# Patient Record
Sex: FEMALE | Race: WHITE | NOT HISPANIC OR LATINO | Employment: OTHER | URBAN - NONMETROPOLITAN AREA
[De-identification: names, ages, dates, MRNs, and addresses within clinical notes are randomized per-mention and may not be internally consistent; named-entity substitution may affect disease eponyms.]

---

## 2018-07-06 ENCOUNTER — HISTORICAL (OUTPATIENT)
Dept: ADMINISTRATIVE | Facility: HOSPITAL | Age: 53
End: 2018-07-06

## 2019-07-16 ENCOUNTER — HISTORICAL (OUTPATIENT)
Dept: ADMINISTRATIVE | Facility: HOSPITAL | Age: 54
End: 2019-07-16

## 2019-09-16 ENCOUNTER — HISTORICAL (OUTPATIENT)
Dept: ADMINISTRATIVE | Facility: HOSPITAL | Age: 54
End: 2019-09-16

## 2020-10-20 ENCOUNTER — HISTORICAL (OUTPATIENT)
Dept: ADMINISTRATIVE | Facility: HOSPITAL | Age: 55
End: 2020-10-20

## 2021-04-07 ENCOUNTER — HISTORICAL (OUTPATIENT)
Dept: ADMINISTRATIVE | Facility: HOSPITAL | Age: 56
End: 2021-04-07

## 2021-04-07 LAB
ABS NEUT (OLG): 4.48 X10(3)/MCL (ref 2.1–9.2)
ALBUMIN SERPL-MCNC: 4.3 GM/DL (ref 3.5–5)
ALBUMIN/GLOB SERPL: 1.4 RATIO (ref 1.1–2)
ALP SERPL-CCNC: 92 UNIT/L (ref 40–150)
ALT SERPL-CCNC: 37 UNIT/L (ref 0–55)
AST SERPL-CCNC: 43 UNIT/L (ref 5–34)
BASOPHILS # BLD AUTO: 0.1 X10(3)/MCL (ref 0–0.2)
BASOPHILS NFR BLD AUTO: 1 %
BILIRUB SERPL-MCNC: 0.4 MG/DL
BILIRUBIN DIRECT+TOT PNL SERPL-MCNC: 0.2 MG/DL (ref 0–0.5)
BILIRUBIN DIRECT+TOT PNL SERPL-MCNC: 0.2 MG/DL (ref 0–0.8)
BUN SERPL-MCNC: 11.5 MG/DL (ref 9.8–20.1)
CALCIUM SERPL-MCNC: 9.9 MG/DL (ref 8.4–10.2)
CHLORIDE SERPL-SCNC: 104 MMOL/L (ref 98–107)
CO2 SERPL-SCNC: 26 MMOL/L (ref 22–29)
CREAT SERPL-MCNC: 0.81 MG/DL (ref 0.55–1.02)
EOSINOPHIL # BLD AUTO: 0.3 X10(3)/MCL (ref 0–0.9)
EOSINOPHIL NFR BLD AUTO: 3 %
ERYTHROCYTE [DISTWIDTH] IN BLOOD BY AUTOMATED COUNT: 13.4 % (ref 11.5–17)
GLOBULIN SER-MCNC: 3.1 GM/DL (ref 2.4–3.5)
GLUCOSE SERPL-MCNC: 163 MG/DL (ref 74–100)
HCT VFR BLD AUTO: 41.8 % (ref 37–47)
HGB BLD-MCNC: 14 GM/DL (ref 12–16)
LYMPHOCYTES # BLD AUTO: 2.3 X10(3)/MCL (ref 0.6–4.6)
LYMPHOCYTES NFR BLD AUTO: 31 %
MCH RBC QN AUTO: 28.1 PG (ref 27–31)
MCHC RBC AUTO-ENTMCNC: 33.5 GM/DL (ref 33–36)
MCV RBC AUTO: 83.9 FL (ref 80–94)
MONOCYTES # BLD AUTO: 0.4 X10(3)/MCL (ref 0.1–1.3)
MONOCYTES NFR BLD AUTO: 5 %
NEUTROPHILS # BLD AUTO: 4.48 X10(3)/MCL (ref 2.1–9.2)
NEUTROPHILS NFR BLD AUTO: 59 %
PLATELET # BLD AUTO: 354 X10(3)/MCL (ref 130–400)
PMV BLD AUTO: 10.1 FL (ref 9.4–12.4)
POTASSIUM SERPL-SCNC: 4.3 MMOL/L (ref 3.5–5.1)
PROT SERPL-MCNC: 7.4 GM/DL (ref 6.4–8.3)
RBC # BLD AUTO: 4.98 X10(6)/MCL (ref 4.2–5.4)
SODIUM SERPL-SCNC: 140 MMOL/L (ref 136–145)
TSH SERPL-ACNC: 0.92 UIU/ML (ref 0.35–4.94)
WBC # SPEC AUTO: 7.6 X10(3)/MCL (ref 4.5–11.5)

## 2021-09-16 ENCOUNTER — HISTORICAL (OUTPATIENT)
Dept: ADMINISTRATIVE | Facility: HOSPITAL | Age: 56
End: 2021-09-16

## 2021-09-23 ENCOUNTER — HISTORICAL (OUTPATIENT)
Dept: ADMINISTRATIVE | Facility: HOSPITAL | Age: 56
End: 2021-09-23

## 2021-10-25 ENCOUNTER — HISTORICAL (OUTPATIENT)
Dept: ADMINISTRATIVE | Facility: HOSPITAL | Age: 56
End: 2021-10-25

## 2022-01-24 ENCOUNTER — HISTORICAL (OUTPATIENT)
Dept: ADMINISTRATIVE | Facility: HOSPITAL | Age: 57
End: 2022-01-24

## 2022-04-10 ENCOUNTER — HISTORICAL (OUTPATIENT)
Dept: ADMINISTRATIVE | Facility: HOSPITAL | Age: 57
End: 2022-04-10

## 2022-04-13 ENCOUNTER — HISTORICAL (OUTPATIENT)
Dept: ADMINISTRATIVE | Facility: HOSPITAL | Age: 57
End: 2022-04-13

## 2022-04-26 VITALS
DIASTOLIC BLOOD PRESSURE: 76 MMHG | SYSTOLIC BLOOD PRESSURE: 120 MMHG | HEIGHT: 65 IN | BODY MASS INDEX: 43.09 KG/M2 | WEIGHT: 258.63 LBS

## 2022-05-11 ENCOUNTER — HISTORICAL (OUTPATIENT)
Dept: ADMINISTRATIVE | Facility: HOSPITAL | Age: 57
End: 2022-05-11

## 2022-10-11 ENCOUNTER — HISTORICAL (OUTPATIENT)
Dept: ADMINISTRATIVE | Facility: HOSPITAL | Age: 57
End: 2022-10-11

## 2022-10-25 ENCOUNTER — HISTORICAL (OUTPATIENT)
Dept: ADMINISTRATIVE | Facility: HOSPITAL | Age: 57
End: 2022-10-25

## 2022-12-26 ENCOUNTER — HISTORICAL (OUTPATIENT)
Dept: ADMINISTRATIVE | Facility: HOSPITAL | Age: 57
End: 2022-12-26
Payer: MEDICARE

## 2023-02-22 DIAGNOSIS — M25.562 LEFT KNEE PAIN: Primary | ICD-10-CM

## 2023-02-23 ENCOUNTER — CLINICAL SUPPORT (OUTPATIENT)
Dept: RESPIRATORY THERAPY | Facility: HOSPITAL | Age: 58
End: 2023-02-23
Attending: SPECIALIST
Payer: MEDICARE

## 2023-02-23 ENCOUNTER — HOSPITAL ENCOUNTER (OUTPATIENT)
Dept: RADIOLOGY | Facility: HOSPITAL | Age: 58
Discharge: HOME | End: 2023-02-23
Attending: SPECIALIST
Payer: MEDICARE

## 2023-02-23 DIAGNOSIS — M25.562 LEFT KNEE PAIN: ICD-10-CM

## 2023-02-23 DIAGNOSIS — Z01.818 PREOP EXAMINATION: ICD-10-CM

## 2023-02-23 PROCEDURE — 71046 X-RAY EXAM CHEST 2 VIEWS: CPT | Mod: TC

## 2023-02-23 PROCEDURE — 93005 ELECTROCARDIOGRAM TRACING: CPT

## 2023-02-23 PROCEDURE — 93010 EKG 12-LEAD: ICD-10-PCS | Mod: ,,, | Performed by: INTERNAL MEDICINE

## 2023-02-23 PROCEDURE — 93010 ELECTROCARDIOGRAM REPORT: CPT | Mod: ,,, | Performed by: INTERNAL MEDICINE

## 2023-02-23 RX ORDER — LOVASTATIN 20 MG/1
20 TABLET ORAL NIGHTLY
COMMUNITY
Start: 2023-01-18 | End: 2023-04-12

## 2023-02-23 RX ORDER — LISINOPRIL 20 MG/1
20 TABLET ORAL EVERY MORNING
COMMUNITY
Start: 2023-01-23 | End: 2023-04-12

## 2023-02-23 RX ORDER — VORTIOXETINE 20 MG/1
1 TABLET, FILM COATED ORAL NIGHTLY
COMMUNITY
Start: 2023-02-13 | End: 2023-04-12

## 2023-02-23 RX ORDER — ALPRAZOLAM 0.5 MG/1
0.5 TABLET ORAL 2 TIMES DAILY
COMMUNITY
Start: 2022-12-05 | End: 2023-04-12

## 2023-02-23 RX ORDER — GABAPENTIN 600 MG/1
TABLET ORAL
COMMUNITY
Start: 2023-02-21

## 2023-02-23 RX ORDER — PIOGLITAZONEHYDROCHLORIDE 30 MG/1
30 TABLET ORAL EVERY MORNING
COMMUNITY
Start: 2017-03-15

## 2023-02-23 RX ORDER — DAPAGLIFLOZIN 10 MG/1
10 TABLET, FILM COATED ORAL EVERY MORNING
COMMUNITY
Start: 2017-03-01

## 2023-02-23 RX ORDER — ESTRADIOL 1 MG/1
1 TABLET ORAL EVERY MORNING
COMMUNITY
Start: 2023-02-08 | End: 2023-04-12

## 2023-02-23 RX ORDER — PANTOPRAZOLE SODIUM 40 MG/1
40 TABLET, DELAYED RELEASE ORAL EVERY MORNING
COMMUNITY
Start: 2023-01-03 | End: 2023-04-12

## 2023-02-23 NOTE — DISCHARGE INSTRUCTIONS
Nothing to eat or drink after midnight. Take Lisinopril AM of procedure with small sip of water.         POST-OP INSTRUCTIONS FOR KNEE ARTHROSCOPY    Virgilio Powell M.D.     Orthopedic Surgery/Sports Medicine     [address] [facility], [place] [address]     [place]. [phone]     Office: [phone]               REMOVE ACE BANDAGE AND DRESSING FROM KNEE 2 DAYS AFTER SURGERY, REMOVE ACE SOONER   IN CASE OF EXCESSIVE PAIN OR SWELLING OF LOWER EXTREMITY.    2. APPLY ICE FOR FIRST FEW DAYS AT 20 MINUTE INTERVALS.    3. USE CRUTCHES AS NEEDED. YOU MAY SLOWLY BEGIN TO PUT MORE WEIGHT   ON YOUR KNEE AS YOU FEEL COMFORTABLE TO DO SO.     4. AFTER THE 2 DAYS, WASH INCISION DAILY WITH ANTIBACTERIAL SOAP AND WATER   AND APPLY BANDAIDS OVER INCISION SITES.   WHEN INCISIONS STOP DRAINING YOU CAN LEAVE OPEN TO AIR.    5. REFRAIN FROM USING HYDROGEN PEROXIDE OR ANTIBIOTIC OINTMENT UNLESS INSTRUCTED TO DO SO.    6.  DO NOT SOAK INCISION IN BATHTUB.    7. CALL DR POWELL OR STAFF IN CASE OF REDNESS, EXCESSIVE DRAINAGE   OR INCREASE IN TEMPERATURE ABOVE 101.5.

## 2023-02-25 ENCOUNTER — PATIENT MESSAGE (OUTPATIENT)
Dept: ADMINISTRATIVE | Facility: OTHER | Age: 58
End: 2023-02-25
Payer: MEDICARE

## 2023-02-27 ENCOUNTER — PATIENT MESSAGE (OUTPATIENT)
Dept: ADMINISTRATIVE | Facility: OTHER | Age: 58
End: 2023-02-27
Payer: MEDICARE

## 2023-02-27 ENCOUNTER — ANESTHESIA EVENT (OUTPATIENT)
Dept: SURGERY | Facility: HOSPITAL | Age: 58
End: 2023-02-27
Payer: MEDICARE

## 2023-02-27 NOTE — ANESTHESIA PREPROCEDURE EVALUATION
02/27/2023  Cari Farmer is a 57 y.o., female.      Pre-op Assessment    I have reviewed the Patient Summary Reports.     I have reviewed the Nursing Notes. I have reviewed the NPO Status.   I have reviewed the Medications.     Review of Systems  Anesthesia Hx:  Denies Family Hx of Anesthesia complications.   Denies Personal Hx of Anesthesia complications.   Social:  Non-Smoker, No Alcohol Use    Hematology/Oncology:  Hematology Normal   Oncology Normal     EENT/Dental:EENT/Dental Normal   Cardiovascular:   Hypertension    Pulmonary:  Pulmonary Normal    Renal/:  Renal/ Normal     Hepatic/GI:   GERD, well controlled    Musculoskeletal:  Musculoskeletal Normal    Neurological:  Neurology Normal    Endocrine:  Endocrine Normal    Psych:   Psychiatric History          Physical Exam  General: Cooperative, Alert and Oriented    Airway:  Mallampati: II   Mouth Opening: Normal  TM Distance: Normal  Tongue: Normal  Neck ROM: Normal ROM    Dental:  Intact        Anesthesia Plan  Type of Anesthesia, risks & benefits discussed:    Anesthesia Type: Gen Supraglottic Airway  Intra-op Monitoring Plan: Standard ASA Monitors  Post Op Pain Control Plan: multimodal analgesia  Informed Consent: Informed consent signed with the Patient and all parties understand the risks and agree with anesthesia plan.  All questions answered. Patient consented to blood products? Yes  ASA Score: 2    Ready For Surgery From Anesthesia Perspective.     .

## 2023-02-28 ENCOUNTER — HOSPITAL ENCOUNTER (OUTPATIENT)
Facility: HOSPITAL | Age: 58
Discharge: HOME | End: 2023-02-28
Attending: SPECIALIST | Admitting: SPECIALIST
Payer: MEDICARE

## 2023-02-28 ENCOUNTER — ANESTHESIA (OUTPATIENT)
Dept: SURGERY | Facility: HOSPITAL | Age: 58
End: 2023-02-28
Payer: MEDICARE

## 2023-02-28 DIAGNOSIS — S83.207A ACUTE MENISCAL TEAR OF LEFT KNEE: Primary | ICD-10-CM

## 2023-02-28 LAB — POCT GLUCOSE: 178 MG/DL (ref 70–110)

## 2023-02-28 PROCEDURE — 71000016 HC POSTOP RECOV ADDL HR: Performed by: SPECIALIST

## 2023-02-28 PROCEDURE — 27201423 OPTIME MED/SURG SUP & DEVICES STERILE SUPPLY: Performed by: SPECIALIST

## 2023-02-28 PROCEDURE — 71000033 HC RECOVERY, INTIAL HOUR: Performed by: SPECIALIST

## 2023-02-28 PROCEDURE — 25000003 PHARM REV CODE 250: Performed by: ANESTHESIOLOGY

## 2023-02-28 PROCEDURE — 25000003 PHARM REV CODE 250

## 2023-02-28 PROCEDURE — 63600175 PHARM REV CODE 636 W HCPCS: Performed by: ANESTHESIOLOGY

## 2023-02-28 PROCEDURE — 37000009 HC ANESTHESIA EA ADD 15 MINS: Performed by: SPECIALIST

## 2023-02-28 PROCEDURE — 63600175 PHARM REV CODE 636 W HCPCS: Performed by: SPECIALIST

## 2023-02-28 PROCEDURE — 37000008 HC ANESTHESIA 1ST 15 MINUTES: Performed by: SPECIALIST

## 2023-02-28 PROCEDURE — 82962 GLUCOSE BLOOD TEST: CPT | Performed by: SPECIALIST

## 2023-02-28 PROCEDURE — 71000015 HC POSTOP RECOV 1ST HR: Performed by: SPECIALIST

## 2023-02-28 PROCEDURE — 36000710: Performed by: SPECIALIST

## 2023-02-28 PROCEDURE — 25000003 PHARM REV CODE 250: Performed by: SPECIALIST

## 2023-02-28 PROCEDURE — 36000711: Performed by: SPECIALIST

## 2023-02-28 PROCEDURE — 63600175 PHARM REV CODE 636 W HCPCS: Performed by: NURSE ANESTHETIST, CERTIFIED REGISTERED

## 2023-02-28 RX ORDER — ONDANSETRON 2 MG/ML
4 INJECTION INTRAMUSCULAR; INTRAVENOUS EVERY 12 HOURS PRN
Status: DISCONTINUED | OUTPATIENT
Start: 2023-02-28 | End: 2023-02-28 | Stop reason: HOSPADM

## 2023-02-28 RX ORDER — HYDROCODONE BITARTRATE AND ACETAMINOPHEN 5; 325 MG/1; MG/1
1 TABLET ORAL EVERY 6 HOURS PRN
Status: DISCONTINUED | OUTPATIENT
Start: 2023-02-28 | End: 2023-02-28 | Stop reason: HOSPADM

## 2023-02-28 RX ORDER — DEXAMETHASONE SODIUM PHOSPHATE 4 MG/ML
INJECTION, SOLUTION INTRA-ARTICULAR; INTRALESIONAL; INTRAMUSCULAR; INTRAVENOUS; SOFT TISSUE
Status: DISCONTINUED | OUTPATIENT
Start: 2023-02-28 | End: 2023-02-28

## 2023-02-28 RX ORDER — TRAMADOL HYDROCHLORIDE 50 MG/1
50 TABLET ORAL EVERY 6 HOURS PRN
Status: DISCONTINUED | OUTPATIENT
Start: 2023-02-28 | End: 2023-02-28 | Stop reason: HOSPADM

## 2023-02-28 RX ORDER — HYDROCODONE BITARTRATE AND ACETAMINOPHEN 10; 325 MG/1; MG/1
1 TABLET ORAL EVERY 6 HOURS PRN
Qty: 20 TABLET | Refills: 0 | Status: SHIPPED | OUTPATIENT
Start: 2023-02-28 | End: 2023-04-12

## 2023-02-28 RX ORDER — FENTANYL CITRATE 50 UG/ML
25 INJECTION, SOLUTION INTRAMUSCULAR; INTRAVENOUS EVERY 5 MIN PRN
Status: DISCONTINUED | OUTPATIENT
Start: 2023-02-28 | End: 2023-02-28 | Stop reason: HOSPADM

## 2023-02-28 RX ORDER — CEFADROXIL 500 MG/1
500 CAPSULE ORAL EVERY 12 HOURS
Qty: 20 CAPSULE | Refills: 0 | Status: SHIPPED | OUTPATIENT
Start: 2023-02-28 | End: 2023-03-10

## 2023-02-28 RX ORDER — PROPOFOL 10 MG/ML
INJECTION, EMULSION INTRAVENOUS
Status: DISCONTINUED | OUTPATIENT
Start: 2023-02-28 | End: 2023-02-28

## 2023-02-28 RX ORDER — HYDROMORPHONE HYDROCHLORIDE 2 MG/ML
0.5 INJECTION, SOLUTION INTRAMUSCULAR; INTRAVENOUS; SUBCUTANEOUS EVERY 6 HOURS PRN
Status: DISCONTINUED | OUTPATIENT
Start: 2023-02-28 | End: 2023-02-28 | Stop reason: HOSPADM

## 2023-02-28 RX ORDER — TRIAMCINOLONE ACETONIDE 40 MG/ML
INJECTION, SUSPENSION INTRA-ARTICULAR; INTRAMUSCULAR
Status: DISCONTINUED | OUTPATIENT
Start: 2023-02-28 | End: 2023-02-28 | Stop reason: HOSPADM

## 2023-02-28 RX ORDER — ACETAMINOPHEN 500 MG
1000 TABLET ORAL
Status: COMPLETED | OUTPATIENT
Start: 2023-02-28 | End: 2023-02-28

## 2023-02-28 RX ORDER — SODIUM CHLORIDE 0.9 % (FLUSH) 0.9 %
10 SYRINGE (ML) INJECTION
Status: DISCONTINUED | OUTPATIENT
Start: 2023-02-28 | End: 2023-02-28 | Stop reason: HOSPADM

## 2023-02-28 RX ORDER — CEFAZOLIN SODIUM 1 G/3ML
2 INJECTION, POWDER, FOR SOLUTION INTRAMUSCULAR; INTRAVENOUS
Status: COMPLETED | OUTPATIENT
Start: 2023-02-28 | End: 2023-02-28

## 2023-02-28 RX ORDER — BUPIVACAINE HYDROCHLORIDE 2.5 MG/ML
INJECTION, SOLUTION EPIDURAL; INFILTRATION; INTRACAUDAL
Status: DISCONTINUED | OUTPATIENT
Start: 2023-02-28 | End: 2023-02-28 | Stop reason: HOSPADM

## 2023-02-28 RX ORDER — GABAPENTIN 300 MG/1
600 CAPSULE ORAL
Status: COMPLETED | OUTPATIENT
Start: 2023-02-28 | End: 2023-02-28

## 2023-02-28 RX ORDER — ASPIRIN 81 MG/1
81 TABLET ORAL DAILY
Qty: 42 TABLET | Refills: 0 | Status: SHIPPED | OUTPATIENT
Start: 2023-02-28 | End: 2023-04-12

## 2023-02-28 RX ORDER — KETOROLAC TROMETHAMINE 30 MG/ML
INJECTION, SOLUTION INTRAMUSCULAR; INTRAVENOUS
Status: DISCONTINUED | OUTPATIENT
Start: 2023-02-28 | End: 2023-02-28

## 2023-02-28 RX ORDER — METOCLOPRAMIDE HYDROCHLORIDE 5 MG/ML
INJECTION INTRAMUSCULAR; INTRAVENOUS
Status: DISCONTINUED | OUTPATIENT
Start: 2023-02-28 | End: 2023-02-28

## 2023-02-28 RX ORDER — SODIUM CHLORIDE, SODIUM LACTATE, POTASSIUM CHLORIDE, CALCIUM CHLORIDE 600; 310; 30; 20 MG/100ML; MG/100ML; MG/100ML; MG/100ML
INJECTION, SOLUTION INTRAVENOUS CONTINUOUS
Status: DISCONTINUED | OUTPATIENT
Start: 2023-02-28 | End: 2023-02-28 | Stop reason: HOSPADM

## 2023-02-28 RX ORDER — ONDANSETRON 4 MG/1
8 TABLET, ORALLY DISINTEGRATING ORAL EVERY 8 HOURS PRN
Status: DISCONTINUED | OUTPATIENT
Start: 2023-02-28 | End: 2023-02-28 | Stop reason: HOSPADM

## 2023-02-28 RX ORDER — ONDANSETRON 2 MG/ML
INJECTION INTRAMUSCULAR; INTRAVENOUS
Status: DISCONTINUED | OUTPATIENT
Start: 2023-02-28 | End: 2023-02-28

## 2023-02-28 RX ORDER — LIDOCAINE HCL/PF 100 MG/5ML
SYRINGE (ML) INTRAVENOUS
Status: DISCONTINUED | OUTPATIENT
Start: 2023-02-28 | End: 2023-02-28

## 2023-02-28 RX ORDER — FENTANYL CITRATE 50 UG/ML
INJECTION, SOLUTION INTRAMUSCULAR; INTRAVENOUS
Status: DISCONTINUED | OUTPATIENT
Start: 2023-02-28 | End: 2023-02-28

## 2023-02-28 RX ADMIN — GABAPENTIN 600 MG: 300 CAPSULE ORAL at 06:02

## 2023-02-28 RX ADMIN — PROPOFOL 200 MG: 10 INJECTION, EMULSION INTRAVENOUS at 06:02

## 2023-02-28 RX ADMIN — Medication 100 MG: at 06:02

## 2023-02-28 RX ADMIN — KETOROLAC TROMETHAMINE 15 MG: 30 INJECTION, SOLUTION INTRAMUSCULAR at 06:02

## 2023-02-28 RX ADMIN — SODIUM CHLORIDE, POTASSIUM CHLORIDE, SODIUM LACTATE AND CALCIUM CHLORIDE: 600; 310; 30; 20 INJECTION, SOLUTION INTRAVENOUS at 06:02

## 2023-02-28 RX ADMIN — DEXAMETHASONE SODIUM PHOSPHATE 4 MG: 4 INJECTION, SOLUTION INTRA-ARTICULAR; INTRALESIONAL; INTRAMUSCULAR; INTRAVENOUS; SOFT TISSUE at 06:02

## 2023-02-28 RX ADMIN — ONDANSETRON 4 MG: 2 INJECTION INTRAMUSCULAR; INTRAVENOUS at 06:02

## 2023-02-28 RX ADMIN — ACETAMINOPHEN 1000 MG: 500 TABLET ORAL at 06:02

## 2023-02-28 RX ADMIN — FENTANYL CITRATE 50 MCG: 50 INJECTION, SOLUTION INTRAMUSCULAR; INTRAVENOUS at 07:02

## 2023-02-28 RX ADMIN — CEFAZOLIN 2 G: 330 INJECTION, POWDER, FOR SOLUTION INTRAMUSCULAR; INTRAVENOUS at 06:02

## 2023-02-28 RX ADMIN — FENTANYL CITRATE 50 MCG: 50 INJECTION, SOLUTION INTRAMUSCULAR; INTRAVENOUS at 06:02

## 2023-02-28 RX ADMIN — METOCLOPRAMIDE 10 MG: 5 INJECTION, SOLUTION INTRAMUSCULAR; INTRAVENOUS at 06:02

## 2023-02-28 NOTE — ANESTHESIA POSTPROCEDURE EVALUATION
Anesthesia Post Evaluation    Patient: Cari Farmer    Procedure(s) Performed: Procedure(s) (LRB):  ARTHROSCOPY, KNEE (Left)    Final Anesthesia Type: general      Patient location during evaluation: PACU  Patient participation: Yes- Able to Participate  Level of consciousness: awake and alert  Post-procedure vital signs: reviewed and stable  Pain management: adequate  Airway patency: patent  STIVEN mitigation strategies: Multimodal analgesia  PONV status at discharge: No PONV  Anesthetic complications: no      Cardiovascular status: blood pressure returned to baseline  Respiratory status: unassisted  Hydration status: euvolemic  Follow-up not needed.          Vitals Value Taken Time   /80 02/28/23 0617   Temp 36.9 °C (98.4 °F) 02/28/23 0617   Pulse 79 02/28/23 0617   Resp 17 02/28/23 0659   SpO2 97 % 02/28/23 0617         No case tracking events are documented in the log.      Pain/Zan Score: Pain Rating Prior to Med Admin: 5 (2/28/2023  6:32 AM)

## 2023-02-28 NOTE — DISCHARGE SUMMARY
[facility] - Periop Services  Discharge Note  Short Stay    Procedure(s) (LRB):  ARTHROSCOPY, KNEE, WITH MENISCECTOMY (Left)      OUTCOME: Patient tolerated treatment/procedure well without complication and is now ready for discharge.    DISPOSITION: Home or Self Care    FINAL DIAGNOSIS:  Acute meniscal tear of left knee    FOLLOWUP: In clinic    DISCHARGE INSTRUCTIONS:    Discharge Procedure Orders   Diet general     Keep surgical extremity elevated     Ice to affected area   Order Comments: using barrier between ice and skin (specify duration&frequency)     No driving, operating heavy equipment or signing legal documents while taking pain medication     Remove dressing in 72 hours   Order Comments: Replace with band aids or other dry dressing     Call MD for:  temperature >100.4     Call MD for:  persistent nausea and vomiting     Call MD for:  severe uncontrolled pain     Call MD for:  difficulty breathing, headache or visual disturbances     Call MD for:  redness, tenderness, or signs of infection (pain, swelling, redness, odor or green/yellow discharge around incision site)     Call MD for:  hives     Call MD for:  persistent dizziness or light-headedness     Call MD for:  extreme fatigue     Weight bearing as tolerated        TIME SPENT ON DISCHARGE: 25 minutes

## 2023-02-28 NOTE — OP NOTE
Operative note     Date: 2/28/2023     Preop diagnosis:   Left knee medial and lateral meniscal tear    Postop diagnosis:  medial meniscal tear and small lateral meniscal tear at the root    Procedure:  arthroscopic partial medial and lateral meniscectomy.    Surgeon: Virgilio Haynes M.D.    Assistant: TEODORO Love    Anesthesia:   General    Complications: none    Blood loss: nil    Procedure in detail:   informed consent was obtained.  Risks of procedure was explained not excluding infection, bleeding, pain, scarring, neurovascular injury.  Spurs from the OR given IV antibiotics and general anesthesia.  Leg was prepped and draped inflow was established.  The knee was examined arthroscopically showed intact ACL and PCL.  Posterior horn of the medial meniscus had a degenerative appearing tear it was trimmed to a stable rim.  She did have a grade 3 lesion of the medial femoral condyle which was stable.  Laterally she had no significant arthritic disease she did have a very small tear of the posterior root attachment the lateral meniscus was trimmed with baskets.  Next the knee was irrigated the ports were closed with nylon sterile dressings applied.  No complication.    Virgilio Haynes M.D.

## 2023-02-28 NOTE — ANESTHESIA PROCEDURE NOTES
Intubation    Date/Time: 2/28/2023 6:49 AM  Performed by: Pepe Laurent CRNA  Authorized by: Toro Zapata DO     Intubation:     Induction:  Intravenous    Mask Ventilation:  N/a    Attempts:  1    Attempted By:  CRNA    Difficult Airway Encountered?: No      Complications:  None    Airway Device:  Supraglottic airway/LMA (IGEL)    Airway Device Size:  3.0    Style/Cuff Inflation:  Uncuffed    Placement Verified By:  Capnometry    Complicating Factors:  None    Findings Post-Intubation:  BS equal bilateral and atraumatic/condition of teeth unchanged

## 2023-03-08 VITALS
TEMPERATURE: 98 F | DIASTOLIC BLOOD PRESSURE: 72 MMHG | WEIGHT: 264 LBS | HEART RATE: 72 BPM | RESPIRATION RATE: 18 BRPM | BODY MASS INDEX: 43.98 KG/M2 | SYSTOLIC BLOOD PRESSURE: 132 MMHG | OXYGEN SATURATION: 97 %

## 2023-03-21 ENCOUNTER — OFFICE VISIT (OUTPATIENT)
Dept: OBSTETRICS AND GYNECOLOGY | Facility: CLINIC | Age: 58
End: 2023-03-21
Payer: MEDICARE

## 2023-03-21 VITALS
DIASTOLIC BLOOD PRESSURE: 82 MMHG | BODY MASS INDEX: 45.05 KG/M2 | HEIGHT: 64 IN | SYSTOLIC BLOOD PRESSURE: 128 MMHG | WEIGHT: 263.88 LBS

## 2023-03-21 DIAGNOSIS — R31.9 HEMATURIA, UNSPECIFIED TYPE: Primary | ICD-10-CM

## 2023-03-21 DIAGNOSIS — R39.15 URINARY URGENCY: ICD-10-CM

## 2023-03-21 LAB
BILIRUB UR QL STRIP: NEGATIVE
GLUCOSE UR QL STRIP: POSITIVE
KETONES UR QL STRIP: NEGATIVE
LEUKOCYTE ESTERASE UR QL STRIP: POSITIVE
PH, POC UA: 5.5
POC BLOOD, URINE: POSITIVE
POC NITRATES, URINE: NEGATIVE
PROT UR QL STRIP: NEGATIVE
SP GR UR STRIP: 1 (ref 1–1.03)
UROBILINOGEN UR STRIP-ACNC: 0.2 (ref 0.1–1.1)

## 2023-03-21 PROCEDURE — 81003 URINALYSIS AUTO W/O SCOPE: CPT | Mod: QW,RHCUB | Performed by: NURSE PRACTITIONER

## 2023-03-21 PROCEDURE — 99212 PR OFFICE/OUTPT VISIT, EST, LEVL II, 10-19 MIN: ICD-10-PCS | Mod: ,,, | Performed by: NURSE PRACTITIONER

## 2023-03-21 PROCEDURE — 99212 OFFICE O/P EST SF 10 MIN: CPT | Mod: ,,, | Performed by: NURSE PRACTITIONER

## 2023-03-21 RX ORDER — PHENAZOPYRIDINE HYDROCHLORIDE 200 MG/1
200 TABLET, FILM COATED ORAL 3 TIMES DAILY PRN
Qty: 20 TABLET | Refills: 0 | Status: SHIPPED | OUTPATIENT
Start: 2023-03-21 | End: 2023-04-12

## 2023-03-21 RX ORDER — NITROFURANTOIN 25; 75 MG/1; MG/1
100 CAPSULE ORAL 2 TIMES DAILY
Qty: 14 CAPSULE | Refills: 0 | Status: SHIPPED | OUTPATIENT
Start: 2023-03-21 | End: 2023-03-28

## 2023-03-21 RX ORDER — FLUCONAZOLE 200 MG/1
TABLET ORAL
Qty: 2 TABLET | Refills: 0 | Status: SHIPPED | OUTPATIENT
Start: 2023-03-21 | End: 2023-04-12

## 2023-03-21 NOTE — PROGRESS NOTES
Chief Complaint:     Chief Complaint   Patient presents with    Urinary Tract Infection     Pain/burn/urgency x 3 days         HPI:   57 y.o.  female  No obstetric history on file. presents with c/o blood in her urine, dysuria x 3-4 days.  Mild relief with AZO otc           Current Outpatient Medications:     ALPRAZolam (XANAX) 0.5 MG tablet, Take 0.5 mg by mouth 2 (two) times daily., Disp: , Rfl:     aspirin (ECOTRIN) 81 MG EC tablet, Take 1 tablet (81 mg total) by mouth once daily., Disp: 42 tablet, Rfl: 0    dapagliflozin (FARXIGA) 10 mg tablet, Take 10 mg by mouth every morning., Disp: , Rfl:     estradioL (ESTRACE) 1 MG tablet, Take 1 mg by mouth every morning., Disp: , Rfl:     gabapentin (NEURONTIN) 600 MG tablet, TAKE 1 TABLET BY MOUTH 4 TIMES DAILY, Disp: , Rfl:     HYDROcodone-acetaminophen (NORCO)  mg per tablet, Take 1 tablet by mouth every 6 (six) hours as needed for Pain., Disp: 20 tablet, Rfl: 0    lisinopriL (PRINIVIL,ZESTRIL) 20 MG tablet, Take 20 mg by mouth every morning., Disp: , Rfl:     lovastatin (MEVACOR) 20 MG tablet, Take 20 mg by mouth every evening., Disp: , Rfl:     pantoprazole (PROTONIX) 40 MG tablet, Take 40 mg by mouth every morning., Disp: , Rfl:     pioglitazone (ACTOS) 30 MG tablet, Take 30 mg by mouth every morning., Disp: , Rfl:     TRINTELLIX 20 mg Tab, Take 1 tablet by mouth every evening., Disp: , Rfl:     nitrofurantoin, macrocrystal-monohydrate, (MACROBID) 100 MG capsule, Take 1 capsule (100 mg total) by mouth 2 (two) times daily. for 7 days, Disp: 14 capsule, Rfl: 0    phenazopyridine (PYRIDIUM) 200 MG tablet, Take 1 tablet (200 mg total) by mouth 3 (three) times daily as needed for Pain., Disp: 20 tablet, Rfl: 0    Review of patient's allergies indicates:   Allergen Reactions    Opioids - morphine analogues Nausea Only, Shortness Of Breath and Nausea And Vomiting    Sulfa (sulfonamide antibiotics) Hives       Social History     Tobacco Use    Smoking status: Never  "   Smokeless tobacco: Never   Substance Use Topics    Alcohol use: Not Currently       Review of Systems   Constitutional:  Negative for appetite change, chills, fatigue, fever and unexpected weight change.   Gastrointestinal:  Negative for abdominal pain, blood in stool, constipation, diarrhea, nausea, vomiting and reflux.   Genitourinary:  Positive for dysuria, frequency and urgency. Negative for bladder incontinence, decreased libido, dysmenorrhea, dyspareunia, flank pain, genital sores, hematuria, hot flashes, menorrhagia, menstrual problem, pelvic pain, vaginal bleeding, vaginal discharge, vaginal pain, urinary incontinence, postcoital bleeding, postmenopausal bleeding, vaginal dryness and vaginal odor.   Integumentary:  Negative for rash, acne, hair changes and mole/lesion.   Neurological:  Negative for headaches.        Physical Exam:   Vitals:    03/21/23 1139   BP: 128/82   BP Location: Right arm   Patient Position: Sitting   Weight: 119.7 kg (263 lb 14.3 oz)   Height: 5' 4" (1.626 m)       Body mass index is 45.3 kg/m².    Physical Exam   Constitutional: She is oriented to person, place, and time. She appears well-developed and well-nourished.   Neurological: She is alert and oriented to person, place, and time.   Psychiatric: Mood normal. Her mood appears not anxious. She does not exhibit a depressed mood.       Assessment:     Patient Active Problem List   Diagnosis    Acute meniscal tear of left knee       Health Maintenance Due   Topic Date Due    Hepatitis C Screening  Never done    Lipid Panel  Never done    HIV Screening  Never done    TETANUS VACCINE  Never done    Hemoglobin A1c (Diabetic Prevention Screening)  Never done    Colorectal Cancer Screening  Never done    Shingles Vaccine (1 of 2) Never done    Influenza Vaccine (1) 09/01/2022     Health Maintenance Topics with due status: Not Due       Topic Last Completion Date    Mammogram 10/25/2022           Plan:    Cari was seen today for " urinary tract infection.    Diagnoses and all orders for this visit:    Hematuria, unspecified type  Macrobid    Urinary urgency  Pyridium prn  Other orders  -     nitrofurantoin, macrocrystal-monohydrate, (MACROBID) 100 MG capsule; Take 1 capsule (100 mg total) by mouth 2 (two) times daily. for 7 days  -     phenazopyridine (PYRIDIUM) 200 MG tablet; Take 1 tablet (200 mg total) by mouth 3 (three) times daily as needed for Pain.    Diflucan prn yeast infection sx after antibiotics per patient request

## 2023-03-27 ENCOUNTER — HOSPITAL ENCOUNTER (EMERGENCY)
Facility: HOSPITAL | Age: 58
Discharge: HOME | End: 2023-03-27
Attending: FAMILY MEDICINE
Payer: MEDICARE

## 2023-03-27 VITALS
BODY MASS INDEX: 45.98 KG/M2 | OXYGEN SATURATION: 95 % | DIASTOLIC BLOOD PRESSURE: 89 MMHG | SYSTOLIC BLOOD PRESSURE: 169 MMHG | HEIGHT: 65 IN | WEIGHT: 276 LBS | RESPIRATION RATE: 18 BRPM | TEMPERATURE: 98 F | HEART RATE: 73 BPM

## 2023-03-27 DIAGNOSIS — J40 BRONCHITIS: Primary | ICD-10-CM

## 2023-03-27 DIAGNOSIS — R05.9 COUGH: ICD-10-CM

## 2023-03-27 PROCEDURE — 63600175 PHARM REV CODE 636 W HCPCS: Performed by: FAMILY MEDICINE

## 2023-03-27 PROCEDURE — 96372 THER/PROPH/DIAG INJ SC/IM: CPT | Performed by: FAMILY MEDICINE

## 2023-03-27 PROCEDURE — 25000003 PHARM REV CODE 250: Performed by: FAMILY MEDICINE

## 2023-03-27 PROCEDURE — 99284 EMERGENCY DEPT VISIT MOD MDM: CPT | Mod: 25

## 2023-03-27 RX ORDER — ALBUTEROL SULFATE 90 UG/1
2 AEROSOL, METERED RESPIRATORY (INHALATION) EVERY 6 HOURS PRN
Qty: 18 G | Refills: 0 | Status: SHIPPED | OUTPATIENT
Start: 2023-03-27 | End: 2023-04-12

## 2023-03-27 RX ORDER — DEXAMETHASONE 4 MG/1
4 TABLET ORAL DAILY
Qty: 7 TABLET | Refills: 0 | Status: SHIPPED | OUTPATIENT
Start: 2023-03-27 | End: 2023-04-03

## 2023-03-27 RX ORDER — DEXAMETHASONE SODIUM PHOSPHATE 4 MG/ML
8 INJECTION, SOLUTION INTRA-ARTICULAR; INTRALESIONAL; INTRAMUSCULAR; INTRAVENOUS; SOFT TISSUE
Status: COMPLETED | OUTPATIENT
Start: 2023-03-27 | End: 2023-03-27

## 2023-03-27 RX ORDER — CLONIDINE HYDROCHLORIDE 0.1 MG/1
0.1 TABLET ORAL
Status: COMPLETED | OUTPATIENT
Start: 2023-03-27 | End: 2023-03-27

## 2023-03-27 RX ADMIN — CLONIDINE HYDROCHLORIDE 0.1 MG: 0.1 TABLET ORAL at 12:03

## 2023-03-27 RX ADMIN — DEXAMETHASONE SODIUM PHOSPHATE 8 MG: 4 INJECTION, SOLUTION INTRA-ARTICULAR; INTRALESIONAL; INTRAMUSCULAR; INTRAVENOUS; SOFT TISSUE at 12:03

## 2023-03-27 NOTE — ED PROVIDER NOTES
Encounter Date: 3/27/2023       History     Chief Complaint   Patient presents with    Cough     AFTER WORKING IN [employer]     Patient presents to the emergency room with a 1 day history of coughing and some wheezing, she noted this after working in the [employer] yesterday.  She has the sniffles and a postnasal drip.  But denies any fevers or chills.    The history is provided by the patient.   Review of patient's allergies indicates:   Allergen Reactions    Opioids - morphine analogues Nausea Only, Shortness Of Breath and Nausea And Vomiting    Sulfa (sulfonamide antibiotics) Hives     Past Medical History:   Diagnosis Date    Anxiety     Depression     Digestive disorder     GERD (gastroesophageal reflux disease)     High cholesterol     Hypertension      Past Surgical History:   Procedure Laterality Date    CARPAL TUNNEL RELEASE Bilateral     CHOLECYSTECTOMY      HYSTERECTOMY      KNEE ARTHROSCOPY W/ ACL RECONSTRUCTION Right     KNEE ARTHROSCOPY W/ MENISCECTOMY Right     KNEE ARTHROSCOPY W/ MENISCECTOMY Left 2/28/2023    Procedure: ARTHROSCOPY, KNEE, WITH MENISCECTOMY;  Surgeon: Virgilio Haynes MD;  Location: [facility];  Service: Orthopedics;  Laterality: Left;    LIPOMA RESECTION      Abdomen    LIPOMA RESECTION      Right Thigh     Family History   Problem Relation Age of Onset    COPD Mother     Hypertension Mother     Colon cancer Father     Hypertension Father     Lung cancer Father      Social History     Tobacco Use    Smoking status: Never    Smokeless tobacco: Never   Substance Use Topics    Alcohol use: Not Currently    Drug use: Yes     Review of Systems   Constitutional:  Negative for fever.   HENT:  Positive for congestion and postnasal drip. Negative for sore throat.    Respiratory:  Positive for cough and wheezing. Negative for shortness of breath.    Cardiovascular:  Negative for chest pain.   Gastrointestinal:  Negative for nausea.   Genitourinary:  Negative for dysuria.   Musculoskeletal:  Negative  for back pain.   Skin:  Negative for rash.   Neurological:  Negative for weakness.   Hematological:  Does not bruise/bleed easily.   All other systems reviewed and are negative.    Physical Exam     Initial Vitals [03/27/23 0034]   BP Pulse Resp Temp SpO2   (!) 188/79 72 18 98.3 °F (36.8 °C) 98 %      MAP       --         Physical Exam    Nursing note and vitals reviewed.  Constitutional: She appears well-developed and well-nourished.   HENT:   Head: Normocephalic and atraumatic.   Eyes: EOM are normal. Pupils are equal, round, and reactive to light.   Neck: Neck supple.   Normal range of motion.  Cardiovascular:  Normal rate, regular rhythm and normal heart sounds.           Pulmonary/Chest:   He is occasional faint wheezing expiratory noted bilaterally.  She has good air movement bilaterally.   Abdominal: Abdomen is soft. Bowel sounds are normal. There is no abdominal tenderness.   Musculoskeletal:         General: No edema. Normal range of motion.      Cervical back: Normal range of motion and neck supple.     Neurological: She is alert and oriented to person, place, and time.   Skin: Skin is warm and dry. Capillary refill takes less than 2 seconds.   Psychiatric: She has a normal mood and affect.       ED Course   Procedures  Labs Reviewed - No data to display       Imaging Results              X-Ray Chest AP Portable (Preliminary result)  Result time 03/27/23 01:13:20      Wet Read by Shawn Robertson MD (03/27/23 01:13:20, [facility])    No acute                                     Medications   cloNIDine tablet 0.1 mg (0.1 mg Oral Given 3/27/23 0056)   dexAMETHasone injection 8 mg (8 mg Intramuscular Given 3/27/23 0056)                              Clinical Impression:   Final diagnoses:  [R05.9] Cough  [J40] Bronchitis (Primary)        ED Disposition Condition    Discharge Stable          ED Prescriptions       Medication Sig Dispense Start Date End Date Auth.  Provider    dexAMETHasone (DECADRON) 4 MG Tab Take 1 tablet (4 mg total) by mouth once daily. for 7 days 7 tablet 3/27/2023 4/3/2023 Shawn Robertson MD    albuterol (PROVENTIL HFA) 90 mcg/actuation inhaler Inhale 2 puffs into the lungs every 6 (six) hours as needed for Wheezing. Rescue 18 g 3/27/2023 -- Shawn Robertson MD          Follow-up Information       Follow up With Specialties Details Why Contact Info    Your PCP   If symptoms worsen              Shawn Robertson MD  03/27/23 0110

## 2023-04-10 ENCOUNTER — HOSPITAL ENCOUNTER (EMERGENCY)
Facility: HOSPITAL | Age: 58
Discharge: HOME | End: 2023-04-10
Payer: MEDICARE

## 2023-04-10 VITALS
BODY MASS INDEX: 43.99 KG/M2 | HEART RATE: 99 BPM | RESPIRATION RATE: 20 BRPM | HEIGHT: 65 IN | DIASTOLIC BLOOD PRESSURE: 90 MMHG | WEIGHT: 264 LBS | TEMPERATURE: 96 F | SYSTOLIC BLOOD PRESSURE: 170 MMHG | OXYGEN SATURATION: 98 %

## 2023-04-10 DIAGNOSIS — K52.9 GASTROENTERITIS: Primary | ICD-10-CM

## 2023-04-10 LAB
ABS NEUT CALC (OHS): 16.34 X10(3)/MCL (ref 2.1–9.2)
ADV 40+41 DNA STL QL NAA+NON-PROBE: NOT DETECTED
ALBUMIN SERPL-MCNC: 4.5 G/DL (ref 3.4–5)
ALBUMIN/GLOB SERPL: 1.3 RATIO
ALP SERPL-CCNC: 115 UNIT/L (ref 50–144)
ALT SERPL-CCNC: 42 UNIT/L (ref 1–45)
ANION GAP SERPL CALC-SCNC: 11 MEQ/L (ref 2–13)
APPEARANCE UR: CLEAR
AST SERPL-CCNC: 42 UNIT/L (ref 14–36)
ASTRO TYP 1-8 RNA STL QL NAA+NON-PROBE: NOT DETECTED
BILIRUB UR QL STRIP.AUTO: NEGATIVE MG/DL
BILIRUBIN DIRECT+TOT PNL SERPL-MCNC: 0.7 MG/DL (ref 0–1)
BUN SERPL-MCNC: 17 MG/DL (ref 7–20)
C CAYETANENSIS DNA STL QL NAA+NON-PROBE: NOT DETECTED
C COLI+JEJ+UPSA DNA STL QL NAA+NON-PROBE: NOT DETECTED
C DIFF TOX GENS STL QL NAA+PROBE: NOT DETECTED
CALCIUM SERPL-MCNC: 9.5 MG/DL (ref 8.4–10.2)
CHLORIDE SERPL-SCNC: 106 MMOL/L (ref 98–110)
CO2 SERPL-SCNC: 21 MMOL/L (ref 21–32)
COLOR UR AUTO: YELLOW
CONSISTENCY STL: NORMAL
CREAT SERPL-MCNC: 0.69 MG/DL (ref 0.66–1.25)
CREAT/UREA NIT SERPL: 25 (ref 12–20)
CRYPTOSP DNA STL QL NAA+NON-PROBE: NOT DETECTED
E HISTOLYT DNA STL QL NAA+NON-PROBE: NOT DETECTED
EAEC PAA PLAS AGGR+AATA ST NAA+NON-PRB: NOT DETECTED
EC STX1+STX2 GENES STL QL NAA+NON-PROBE: NOT DETECTED
EOSINOPHIL NFR BLD MANUAL: 0.38 X10(3)/MCL (ref 0–0.9)
EOSINOPHIL NFR BLD MANUAL: 2 % (ref 0–8)
EPEC EAE GENE STL QL NAA+NON-PROBE: NOT DETECTED
ERYTHROCYTE [DISTWIDTH] IN BLOOD BY AUTOMATED COUNT: 15.5 % (ref 11–14.5)
ETEC LTA+ST1A+ST1B TOX ST NAA+NON-PROBE: NOT DETECTED
G LAMBLIA DNA STL QL NAA+NON-PROBE: NOT DETECTED
GFR SERPLBLD CREATININE-BSD FMLA CKD-EPI: >90 MLS/MIN/1.73/M2
GLOBULIN SER-MCNC: 3.6 GM/DL (ref 2–3.9)
GLUCOSE SERPL-MCNC: 310 MG/DL (ref 70–115)
GLUCOSE UR QL STRIP.AUTO: >=1000 MG/DL
HCT VFR BLD AUTO: 48.1 % (ref 36–48)
HGB BLD-MCNC: 15.4 G/DL (ref 11.8–16)
IMM GRANULOCYTES # BLD AUTO: 0.06 X10(3)/MCL (ref 0–0.03)
IMM GRANULOCYTES NFR BLD AUTO: 0.3 % (ref 0–0.5)
KETONES UR QL STRIP.AUTO: 15 MG/DL
LEUKOCYTE ESTERASE UR QL STRIP.AUTO: NEGATIVE UNIT/L
LIPASE SERPL-CCNC: 236 U/L (ref 23–300)
LYMPHOCYTES NFR BLD MANUAL: 1.33 X10(3)/MCL
LYMPHOCYTES NFR BLD MANUAL: 7 % (ref 13–40)
MCH RBC QN AUTO: 27.4 PG (ref 27–34)
MCV RBC AUTO: 85.4 FL (ref 79–99)
MEAN CELL HEMOGLOBIN CONCENTRATION (OHS) G/DL: 32 G/DL (ref 31–37)
MONOCYTES NFR BLD MANUAL: 0.95 X10(3)/MCL (ref 0.1–1.3)
MONOCYTES NFR BLD MANUAL: 5 % (ref 2–11)
NEUTROPHILS NFR BLD MANUAL: 77 % (ref 47–80)
NEUTS BAND NFR BLD MANUAL: 9 % (ref 0–11)
NITRITE UR QL STRIP.AUTO: NEGATIVE
NOROVIRUS GI+II RNA STL QL NAA+NON-PROBE: NOT DETECTED
NRBC BLD AUTO-RTO: 0 % (ref 0–1)
P SHIGELLOIDES DNA STL QL NAA+NON-PROBE: NOT DETECTED
PH UR STRIP.AUTO: 5.5 [PH]
PLATELET # BLD AUTO: 299 X10(3)/MCL (ref 140–371)
PLATELET # BLD EST: ADEQUATE 10*3/UL
PMV BLD AUTO: 9.4 FL (ref 9.4–12.4)
POTASSIUM SERPL-SCNC: 4.1 MMOL/L (ref 3.5–5.1)
PROT SERPL-MCNC: 8.1 GM/DL (ref 6.3–8.2)
PROT UR QL STRIP.AUTO: NEGATIVE MG/DL
RBC # BLD AUTO: 5.63 X10(6)/MCL (ref 4–5.1)
RBC MORPH BLD: NORMAL
RBC UR QL AUTO: NEGATIVE UNIT/L
RVA RNA STL QL NAA+NON-PROBE: NOT DETECTED
S ENT+BONG DNA STL QL NAA+NON-PROBE: NOT DETECTED
SAPO I+II+IV+V RNA STL QL NAA+NON-PROBE: NOT DETECTED
SHIGELLA SP+EIEC IPAH ST NAA+NON-PROBE: NOT DETECTED
SODIUM SERPL-SCNC: 138 MMOL/L (ref 135–145)
SP GR UR STRIP.AUTO: 1.01
UROBILINOGEN UR STRIP-ACNC: 0.2 MG/DL
V CHOL+PARA+VUL DNA STL QL NAA+NON-PROBE: NOT DETECTED
V CHOLERAE DNA STL QL NAA+NON-PROBE: NOT DETECTED
WBC # SPEC AUTO: 19 X10(3)/MCL (ref 4–11.5)
Y ENTEROCOL DNA STL QL NAA+NON-PROBE: NOT DETECTED

## 2023-04-10 PROCEDURE — 85027 COMPLETE CBC AUTOMATED: CPT

## 2023-04-10 PROCEDURE — 87507 IADNA-DNA/RNA PROBE TQ 12-25: CPT

## 2023-04-10 PROCEDURE — 80053 COMPREHEN METABOLIC PANEL: CPT

## 2023-04-10 PROCEDURE — 85025 COMPLETE CBC W/AUTO DIFF WBC: CPT

## 2023-04-10 PROCEDURE — 81003 URINALYSIS AUTO W/O SCOPE: CPT

## 2023-04-10 PROCEDURE — 63600175 PHARM REV CODE 636 W HCPCS

## 2023-04-10 PROCEDURE — 96361 HYDRATE IV INFUSION ADD-ON: CPT

## 2023-04-10 PROCEDURE — 87493 C DIFF AMPLIFIED PROBE: CPT

## 2023-04-10 PROCEDURE — 96374 THER/PROPH/DIAG INJ IV PUSH: CPT

## 2023-04-10 PROCEDURE — 25000003 PHARM REV CODE 250

## 2023-04-10 PROCEDURE — 83690 ASSAY OF LIPASE: CPT

## 2023-04-10 PROCEDURE — 36415 COLL VENOUS BLD VENIPUNCTURE: CPT

## 2023-04-10 PROCEDURE — 96375 TX/PRO/DX INJ NEW DRUG ADDON: CPT

## 2023-04-10 PROCEDURE — 96372 THER/PROPH/DIAG INJ SC/IM: CPT

## 2023-04-10 PROCEDURE — 99284 EMERGENCY DEPT VISIT MOD MDM: CPT | Mod: 25

## 2023-04-10 RX ORDER — PROMETHAZINE HYDROCHLORIDE 25 MG/1
25 TABLET ORAL EVERY 6 HOURS PRN
Qty: 20 TABLET | Refills: 0 | Status: SHIPPED | OUTPATIENT
Start: 2023-04-10 | End: 2023-04-30

## 2023-04-10 RX ORDER — FAMOTIDINE 10 MG/ML
20 INJECTION INTRAVENOUS
Status: COMPLETED | OUTPATIENT
Start: 2023-04-10 | End: 2023-04-10

## 2023-04-10 RX ORDER — PROCHLORPERAZINE EDISYLATE 5 MG/ML
10 INJECTION INTRAMUSCULAR; INTRAVENOUS
Status: COMPLETED | OUTPATIENT
Start: 2023-04-10 | End: 2023-04-10

## 2023-04-10 RX ORDER — HYDROXYZINE 50 MG/ML
100 INJECTION, SOLUTION INTRAMUSCULAR ONCE
Status: COMPLETED | OUTPATIENT
Start: 2023-04-10 | End: 2023-04-10

## 2023-04-10 RX ADMIN — SODIUM CHLORIDE 1000 ML: 9 INJECTION, SOLUTION INTRAVENOUS at 03:04

## 2023-04-10 RX ADMIN — SODIUM CHLORIDE 1000 ML: 9 INJECTION, SOLUTION INTRAVENOUS at 02:04

## 2023-04-10 RX ADMIN — PROCHLORPERAZINE EDISYLATE 10 MG: 5 INJECTION INTRAMUSCULAR; INTRAVENOUS at 02:04

## 2023-04-10 RX ADMIN — FAMOTIDINE 20 MG: 10 INJECTION, SOLUTION INTRAVENOUS at 02:04

## 2023-04-10 RX ADMIN — HYDROXYZINE HYDROCHLORIDE 100 MG: 50 INJECTION, SOLUTION INTRAMUSCULAR at 02:04

## 2023-04-10 NOTE — ED PROVIDER NOTES
"Encounter Date: 4/10/2023       History     Chief Complaint   Patient presents with    Abdominal Pain     TO ED WITH C/O ABD PAIN, N/V/D SINCE 10AM THIS MORNING. SHE IS ALSO STILL HAVING SOME COUGHING AND WHEEZING X 6 WEEKS.      57-year-old female presents complaining of nausea, vomiting and diarrhea since 10 o'clock this morning.  She thinks it may be due to bad food that she ate last night.  She heated up some leftovers, and they "tasted funny".  She is been recently treated for bronchitis, and has been on a couple of rounds of antibiotics.  She does not have a history of GI problems.  She complains of feeling weak.  She denies any fever or chills.    The history is provided by the patient.   Review of patient's allergies indicates:   Allergen Reactions    Opioids - morphine analogues Nausea Only, Shortness Of Breath and Nausea And Vomiting    Sulfa (sulfonamide antibiotics) Hives     Past Medical History:   Diagnosis Date    Anxiety     Depression     Digestive disorder     GERD (gastroesophageal reflux disease)     High cholesterol     Hypertension      Past Surgical History:   Procedure Laterality Date    CARPAL TUNNEL RELEASE Bilateral     CHOLECYSTECTOMY      HYSTERECTOMY      KNEE ARTHROSCOPY W/ ACL RECONSTRUCTION Right     KNEE ARTHROSCOPY W/ MENISCECTOMY Right     KNEE ARTHROSCOPY W/ MENISCECTOMY Left 2/28/2023    Procedure: ARTHROSCOPY, KNEE, WITH MENISCECTOMY;  Surgeon: Virgilio Haynes MD;  Location: [facility];  Service: Orthopedics;  Laterality: Left;    LIPOMA RESECTION      Abdomen    LIPOMA RESECTION      Right Thigh     Family History   Problem Relation Age of Onset    COPD Mother     Hypertension Mother     Colon cancer Father     Hypertension Father     Lung cancer Father      Social History     Tobacco Use    Smoking status: Never    Smokeless tobacco: Never   Substance Use Topics    Alcohol use: Not Currently    Drug use: Yes     Review of Systems   Gastrointestinal:  Positive for abdominal " pain, diarrhea, nausea and vomiting.   Neurological:  Positive for weakness.   All other systems reviewed and are negative.    Physical Exam     Initial Vitals [04/10/23 1319]   BP Pulse Resp Temp SpO2   (!) 146/67 (!) 116 (!) 22 96.4 °F (35.8 °C) 97 %      MAP       --         Physical Exam    Nursing note and vitals reviewed.  Constitutional: Vital signs are normal. She appears well-developed and well-nourished. She is cooperative.   She is freely conversant, but appears fatigued   HENT:   Head: Normocephalic and atraumatic.   Eyes: Conjunctivae, EOM and lids are normal. Pupils are equal, round, and reactive to light.   Neck: Trachea normal. Neck supple.   Normal range of motion.  Cardiovascular:  Normal rate, regular rhythm, normal heart sounds and intact distal pulses.           Pulmonary/Chest: Breath sounds normal.   Abdominal: Abdomen is soft. Bowel sounds are normal.   Musculoskeletal:         General: Normal range of motion.      Cervical back: Normal, normal range of motion and neck supple.      Lumbar back: Normal.     Neurological: She is alert and oriented to person, place, and time. She has normal strength and normal reflexes. Coordination normal.   Skin: Skin is warm, dry and intact. Capillary refill takes less than 2 seconds.   Psychiatric: Her speech is normal and behavior is normal. Judgment and thought content normal. Cognition and memory are normal.   She has a flat affect, and she seems a bit anxious       ED Course   Procedures  Labs Reviewed   COMPREHENSIVE METABOLIC PANEL - Abnormal; Notable for the following components:       Result Value    Glucose Level 310 (*)     Aspartate Aminotransferase 42 (*)     BUN/Creatinine Ratio 25 (*)     All other components within normal limits   URINALYSIS, REFLEX TO URINE CULTURE - Abnormal; Notable for the following components:    Glucose, UA >=1000 (*)     Ketones, UA 15 (*)     All other components within normal limits    Narrative:      URINE STABILITY  IS 2 HOURS AT ROOM TEMP OR    SIX HOURS REFRIGERATED. PERFORMING TESTING ON    SPECIMENS GREATER THAN THIS AGE MAY AFFECT THE    FOLLOWING TESTS:    PH          SPECIFIC GRAVITY           BLOOD    CLARITY     BILIRUBIN               UROBILINOGEN   CBC WITH DIFFERENTIAL - Abnormal; Notable for the following components:    WBC 19.0 (*)     RBC 5.63 (*)     Hct 48.1 (*)     RDW 15.5 (*)     IG# 0.06 (*)     All other components within normal limits   MANUAL DIFFERENTIAL - Abnormal; Notable for the following components:    Lymph Man 7 (*)     Abs Neut calc 16.34 (*)     All other components within normal limits   C DIFF TOXIN BY PCR - Normal    Narrative:     The Portrait Toxigenic C. difficile assay has not been   evaluated in patients that are <2 years of age.       LIPASE - Normal   CBC W/ AUTO DIFFERENTIAL    Narrative:     The following orders were created for panel order CBC auto differential.  Procedure                               Abnormality         Status                     ---------                               -----------         ------                     CBC with Differential[[id number]]        Abnormal            Final result               Manual Differential[[id number]]          Abnormal            Final result                 Please view results for these tests on the individual orders.   GI PANEL    Narrative:     The BioFire GI Panel is a multiplexed nucleic acid test intended for use with the BioFirMetranome® FilmArray®, FilmArray® 2.0, or FilmArray® Mass Fidelity Systems for the simultaneous qualitative detection and identification of nucleic acids from multiple bacteria, viruses, and parasites directly from stool samples in [employer] transport medium obtained from individuals with signs and/or symptoms of gastrointestinal infection.          Imaging Results    None          Medications   sodium chloride 0.9% bolus 1,000 mL 1,000 mL (0 mLs Intravenous Stopped 4/10/23 3087)   hydrOXYzine injection 100 mg (100 mg  Intramuscular Given 4/10/23 1439)   prochlorperazine injection Soln 10 mg (10 mg Intravenous Given 4/10/23 1447)   famotidine (PF) injection 20 mg (20 mg Intravenous Given 4/10/23 1447)   sodium chloride 0.9% bolus 1,000 mL 1,000 mL (0 mLs Intravenous Stopped 4/10/23 1703)     Medical Decision Making:   Initial Assessment:   Nausea, vomiting, diarrhea x4 hours, history of recent antibiotic use  Differential Diagnosis:   Simple gastroenteritis, C diff, food poisoning  Clinical Tests:   Lab Tests: Ordered  ED Management:  IV fluids, antiemetics, Pepcid  Basic labs, including stool studies  Patient feeling much better  Waiting on stool studies.                        Clinical Impression:   Final diagnoses:  [K52.9] Gastroenteritis (Primary)        ED Disposition Condition    Discharge Good          ED Prescriptions       Medication Sig Dispense Start Date End Date Auth. Provider    promethazine (PHENERGAN) 25 MG tablet Take 1 tablet (25 mg total) by mouth every 6 (six) hours as needed for Nausea. 20 tablet 4/10/2023 4/30/2023 Ari Fair MD          Follow-up Information       Follow up With Specialties Details Why Contact Info    Jon Fox MD Internal Medicine, General Practice Call in 1 day  [address]  [address]   [place] [address] [phone]               Ari Fair MD  04/10/23 3963

## 2023-04-10 NOTE — Clinical Note
"Cari"Jane Farmer was seen and treated in our emergency department on 4/10/2023.  She may return to work on 04/11/2023.       If you have any questions or concerns, please don't hesitate to call.      Ari Fair MD"

## 2023-04-12 ENCOUNTER — OFFICE VISIT (OUTPATIENT)
Dept: OBSTETRICS AND GYNECOLOGY | Facility: CLINIC | Age: 58
End: 2023-04-12
Payer: MEDICARE

## 2023-04-12 VITALS
SYSTOLIC BLOOD PRESSURE: 120 MMHG | DIASTOLIC BLOOD PRESSURE: 76 MMHG | BODY MASS INDEX: 45.66 KG/M2 | HEIGHT: 65 IN | WEIGHT: 274.06 LBS

## 2023-04-12 DIAGNOSIS — R30.9 PAINFUL URINATION: Primary | ICD-10-CM

## 2023-04-12 LAB
BILIRUB UR QL STRIP: NEGATIVE
GLUCOSE UR QL STRIP: POSITIVE
KETONES UR QL STRIP: NEGATIVE
LEUKOCYTE ESTERASE UR QL STRIP: NEGATIVE
PH, POC UA: 5
POC BLOOD, URINE: POSITIVE
POC NITRATES, URINE: NEGATIVE
PROT UR QL STRIP: NEGATIVE
SP GR UR STRIP: 1.01 (ref 1–1.03)
UROBILINOGEN UR STRIP-ACNC: 0.2 (ref 0.1–1.1)

## 2023-04-12 PROCEDURE — 81003 URINALYSIS AUTO W/O SCOPE: CPT | Mod: QW,RHCUB

## 2023-04-12 PROCEDURE — 99212 PR OFFICE/OUTPT VISIT, EST, LEVL II, 10-19 MIN: ICD-10-PCS | Mod: ,,,

## 2023-04-12 PROCEDURE — 99212 OFFICE O/P EST SF 10 MIN: CPT | Mod: ,,,

## 2023-04-12 RX ORDER — LISINOPRIL 20 MG/1
TABLET ORAL
COMMUNITY
Start: 2000-03-15

## 2023-04-12 RX ORDER — ALBUTEROL SULFATE 0.83 MG/ML
SOLUTION RESPIRATORY (INHALATION)
COMMUNITY
Start: 2023-03-29

## 2023-04-12 RX ORDER — ALPRAZOLAM 0.5 MG/1
0.5 TABLET ORAL 2 TIMES DAILY
COMMUNITY
Start: 2009-01-20

## 2023-04-12 RX ORDER — ESTRADIOL 1 MG/1
1 TABLET ORAL
Status: ON HOLD | COMMUNITY
Start: 1994-08-24 | End: 2023-12-01 | Stop reason: HOSPADM

## 2023-04-12 RX ORDER — LOVASTATIN 20 MG/1
TABLET ORAL
COMMUNITY
Start: 2016-03-24

## 2023-04-12 RX ORDER — NITROFURANTOIN 25; 75 MG/1; MG/1
100 CAPSULE ORAL 2 TIMES DAILY
Qty: 14 CAPSULE | Refills: 0 | Status: SHIPPED | OUTPATIENT
Start: 2023-04-12 | End: 2023-04-19

## 2023-04-12 RX ORDER — FLUCONAZOLE 200 MG/1
200 TABLET ORAL DAILY
Qty: 4 TABLET | Refills: 0 | Status: SHIPPED | OUTPATIENT
Start: 2023-04-12 | End: 2023-04-16

## 2023-04-12 RX ORDER — PHENAZOPYRIDINE HYDROCHLORIDE 200 MG/1
200 TABLET, FILM COATED ORAL 3 TIMES DAILY PRN
Qty: 15 TABLET | Refills: 0 | Status: SHIPPED | OUTPATIENT
Start: 2023-04-12 | End: 2023-04-17

## 2023-04-12 RX ORDER — PANTOPRAZOLE SODIUM 40 MG/1
FOR SUSPENSION ORAL
Status: ON HOLD | COMMUNITY
Start: 2018-03-15 | End: 2023-12-01 | Stop reason: HOSPADM

## 2023-04-12 RX ORDER — CONJUGATED ESTROGENS 0.62 MG/G
CREAM VAGINAL
COMMUNITY
Start: 2022-08-29 | End: 2023-06-16

## 2023-04-15 LAB
BACTERIA UR CULT: ABNORMAL
BACTERIA UR CULT: ABNORMAL
OTHER ANTIBIOTIC SUSC ISLT: ABNORMAL

## 2023-04-17 NOTE — PROGRESS NOTES
"  Subjective:      Patient ID: Cari Farmer is a 57 y.o. female.    Chief Complaint:  Bladder spasums (C/O pain in bladder after urination, Denies other problems, Was In er due to stomach bug believes she now has bladder infection, Urine was taking in hospital per PT. )      History of Present Illness  HPI  Cari Farmer 57 y.o. White female  presents to clinic with c/o bladder pain after urination x3 days. Pt has recently had a stomach virus and now believes she may have a bladder infection. Pt has tried AZO with little relief. She is s/p hysterectomy.       GYN & OB History  No LMP recorded. Patient has had a hysterectomy.   Date of Last Pap: No result found    OB History    Para Term  AB Living   0 0 0 0 0 0   SAB IAB Ectopic Multiple Live Births   0 0 0 0 0       Review of Systems  Review of Systems   Constitutional: Negative.    HENT: Negative.     Eyes: Negative.    Respiratory: Negative.     Cardiovascular: Negative.    Gastrointestinal: Negative.    Endocrine: Negative.    Genitourinary:  Positive for dysuria.   Musculoskeletal: Negative.    Integumentary:  Negative.   Neurological: Negative.    Hematological: Negative.    Psychiatric/Behavioral: Negative.     Breast: negative.         Objective:     /76   Ht 5' 5" (1.651 m)   Wt 124.3 kg (274 lb 0.5 oz)   BMI 45.60 kg/m²     Physical Exam:   Constitutional: She is oriented to person, place, and time. She appears well-developed and well-nourished.    HENT:   Head: Normocephalic.    Eyes: Pupils are equal, round, and reactive to light. EOM are normal.     Cardiovascular:  Normal rate.             Pulmonary/Chest: Effort normal.        Abdominal: Soft.             Musculoskeletal: Normal range of motion.       Neurological: She is alert and oriented to person, place, and time.    Skin: Skin is warm and dry.    Psychiatric: She has a normal mood and affect.       Assessment:     1. Painful urination            Plan:   Rx: " Macrobid  Rx: Pyridium  Rx: Diflucan for yeast prevention  Urine Culture  Increase H20 intake  F/u as needed.

## 2023-05-18 ENCOUNTER — TELEPHONE (OUTPATIENT)
Dept: OBSTETRICS AND GYNECOLOGY | Facility: CLINIC | Age: 58
End: 2023-05-18
Payer: MEDICARE

## 2023-05-18 DIAGNOSIS — B37.9 YEAST INFECTION: Primary | ICD-10-CM

## 2023-05-18 RX ORDER — FLUCONAZOLE 200 MG/1
200 TABLET ORAL
Qty: 2 TABLET | Refills: 0 | Status: SHIPPED | OUTPATIENT
Start: 2023-05-18 | End: 2023-05-22

## 2023-05-18 NOTE — TELEPHONE ENCOUNTER
Spoke w/ patient-requesting Diflucan for external vaginal itching and tingling sensation with urination. Pt states these are her typical symptoms w/ yeast infection. Will treat w/ Diflucan as directed. Offered external cream, but patient declined and will use Monistat OTC

## 2023-05-18 NOTE — TELEPHONE ENCOUNTER
----- Message from Brylee Guillory sent at 5/18/2023 10:10 AM CDT -----  Regarding: Pt Advice  Contact: Cari  Patient called asking for advice about having a possible yeast infection. Pt states that she is itching on the outside of her vaginal area. Please call patient at [phone]. Thanks!

## 2023-06-09 ENCOUNTER — HOSPITAL ENCOUNTER (OUTPATIENT)
Dept: RADIOLOGY | Facility: HOSPITAL | Age: 58
Discharge: HOME | End: 2023-06-09
Attending: SPECIALIST
Payer: MEDICARE

## 2023-06-09 DIAGNOSIS — M25.562 LEFT KNEE PAIN: ICD-10-CM

## 2023-06-09 PROCEDURE — 73560 X-RAY EXAM OF KNEE 1 OR 2: CPT | Mod: TC,50

## 2023-06-16 ENCOUNTER — OFFICE VISIT (OUTPATIENT)
Dept: OBSTETRICS AND GYNECOLOGY | Facility: CLINIC | Age: 58
End: 2023-06-16
Payer: MEDICARE

## 2023-06-16 VITALS
SYSTOLIC BLOOD PRESSURE: 132 MMHG | BODY MASS INDEX: 45.65 KG/M2 | WEIGHT: 274 LBS | HEIGHT: 65 IN | DIASTOLIC BLOOD PRESSURE: 80 MMHG

## 2023-06-16 DIAGNOSIS — N76.4 VULVAR FURUNCLE: Primary | ICD-10-CM

## 2023-06-16 PROCEDURE — 99213 PR OFFICE/OUTPT VISIT, EST, LEVL III, 20-29 MIN: ICD-10-PCS | Mod: ,,,

## 2023-06-16 PROCEDURE — 99213 OFFICE O/P EST LOW 20 MIN: CPT | Mod: ,,,

## 2023-06-16 RX ORDER — FLUCONAZOLE 200 MG/1
200 TABLET ORAL EVERY OTHER DAY
Qty: 4 TABLET | Refills: 0 | Status: SHIPPED | OUTPATIENT
Start: 2023-06-16 | End: 2023-06-23

## 2023-06-16 RX ORDER — TIRZEPATIDE 2.5 MG/.5ML
INJECTION, SOLUTION SUBCUTANEOUS
Status: ON HOLD | COMMUNITY
Start: 2023-06-07 | End: 2023-12-01 | Stop reason: HOSPADM

## 2023-06-16 RX ORDER — SULFAMETHOXAZOLE AND TRIMETHOPRIM 800; 160 MG/1; MG/1
1 TABLET ORAL 2 TIMES DAILY
Qty: 20 TABLET | Refills: 0 | Status: SHIPPED | OUTPATIENT
Start: 2023-06-16 | End: 2023-06-26

## 2023-06-19 NOTE — PROGRESS NOTES
"  Subjective:      Patient ID: Cari Farmer is a 57 y.o. female.    Chief Complaint:  vaginal bump (Pt c/o bump on inside of vagina x 5 days. )      History of Present Illness  HPI  Cari Farmer 57 y.o. White female  presents to clinic with c/o a tender vaginal bump to her right labia. Pt states she noticed it 5 days prior. States is feels "like its under the skin." Denies any fever or drainage.       GYN & OB History  No LMP recorded. Patient has had a hysterectomy.   Date of Last Pap: No result found    OB History    Para Term  AB Living   0 0 0 0 0 0   SAB IAB Ectopic Multiple Live Births   0 0 0 0 0       Review of Systems  Review of Systems   Constitutional: Negative.    HENT: Negative.     Eyes: Negative.    Respiratory: Negative.     Cardiovascular: Negative.    Gastrointestinal: Negative.    Endocrine: Negative.    Genitourinary:  Positive for vaginal pain.   Musculoskeletal: Negative.    Integumentary:  Negative.   Neurological: Negative.    Hematological: Negative.    Psychiatric/Behavioral: Negative.     Breast: negative.    +vaginal lump to right labia majora     Objective:     /80 (BP Location: Right arm)   Ht 5' 5" (1.651 m)   Wt 124.3 kg (274 lb)   BMI 45.60 kg/m²     Physical Exam:   Constitutional: She is oriented to person, place, and time. She appears well-developed and well-nourished.    HENT:   Head: Normocephalic.    Eyes: Pupils are equal, round, and reactive to light. EOM are normal.     Cardiovascular:  Normal rate.             Pulmonary/Chest: Effort normal.        Abdominal: Soft.     Genitourinary:    Inguinal canal, urethra, vagina, right adnexa and rectum normal.            Pelvic exam was performed with patient in the lithotomy position.   The external female genitalia was normal.   Labial bartholins normal.Normal urethral meatus.   Genitourinary Comments: [id number] hard nodule noted under the skin of the distal right labia. There is no obvious skin " changes. Tender to palpation. No drainage or signs of infection noted.              Musculoskeletal: Normal range of motion.       Neurological: She is alert and oriented to person, place, and time.    Skin: Skin is warm and dry.    Psychiatric: She has a normal mood and affect.       Assessment:     1. Vulvar furuncle       Plan:   Rx: Bactrim for possible staph infection  Rx: Diflucan d/t pt being susceptible to yeast infections on antibiotics  Warm compresses  NSAIDS for pain  F/u if no improvement within a week after antibiotics for possible I&D.

## 2023-06-20 ENCOUNTER — TELEPHONE (OUTPATIENT)
Dept: OBSTETRICS AND GYNECOLOGY | Facility: CLINIC | Age: 58
End: 2023-06-20
Payer: MEDICARE

## 2023-06-20 NOTE — TELEPHONE ENCOUNTER
----- Message from Erin Zheng sent at 6/20/2023 10:21 AM CDT -----  Regarding: Nurse call  .Type:  Needs Medical Advice    Who Called:  patient  Best Call Back Number:  [phone]  Additional Information: saw Yoselyn Friday (6/16) for vag inner bump, told Yoselyn she never had this before and it's not like an ingrown hair; problem has not gone away, has to wipe gently now- she thinks the skin has broken because it burns; is unsure if Mrs Ramos wants her to finish the antibiotics and then make an appt or come before they are done (spoke w/Mrs Ramos- wants her to finish first); informed patient will send message and have nurses talk with Mrs Ramos to see how soon to get her in     February 25, 2022      Ari Saldaña  [address]  [facility] [place] [address]        Dear Zeppelin,     Your STD screening tests were negative.     Sincerely,   Tad Avila MD      Resulted Orders   Herpes Simplex Virus 1 and 2 IgG   Result Value Ref Range    HSV Type 1 IgG Instrument Value 0.43 <0.90 Index    Herpes Simplex Virus Type 1 IgG Antibody No HSV-1 IgG antibodies detected. No HSV-1 IgG antibodies detected    HSV Type 2 IgG Instrument Value 0.41 <0.90 Index    Herpes Simplex Virus Type 2 IgG Antibody No HSV-2 IgG antibodies detected. No HSV-2 IgG antibodies detected   Treponema Abs w Reflex to RPR and Titer   Result Value Ref Range    Treponema Antibody Total Nonreactive Nonreactive   Hepatitis C Screen Reflex to HCV RNA Quant and Genotype   Result Value Ref Range    Hepatitis C Antibody Nonreactive Nonreactive    Narrative    Assay performance characteristics have not been established for newborns, infants, and children.   HIV Antigen Antibody Combo   Result Value Ref Range    HIV Antigen Antibody Combo Nonreactive Nonreactive      Comment:      HIV-1 p24 Ag & HIV-1/HIV-2 Ab Not Detected   CHLAMYDIA TRACHOMATIS PCR   Result Value Ref Range    Chlamydia trachomatis Negative Negative      Comment:      A negative result by transcription mediated amplification does not preclude the presence of C. trachomatis infection because results are dependent on proper and adequate collection, absence of inhibitors and sufficient rRNA to be detected.   NEISSERIA GONORRHOEA PCR   Result Value Ref Range    Neisseria gonorrhoeae Negative Negative      Comment:      Negative for N. gonorrhoeae rRNA by transcription mediated amplification. A negative result by transcription mediated amplification does not preclude the presence of C. trachomatis infection because results are dependent on proper and adequate collection, absence of inhibitors and sufficient rRNA to be detected.   Hepatitis B surface antigen   Result  Value Ref Range    Hepatitis B Surface Antigen Nonreactive Nonreactive

## 2023-06-21 ENCOUNTER — OFFICE VISIT (OUTPATIENT)
Dept: OBSTETRICS AND GYNECOLOGY | Facility: CLINIC | Age: 58
End: 2023-06-21
Payer: MEDICARE

## 2023-06-21 ENCOUNTER — DOCUMENT SCAN (OUTPATIENT)
Dept: OBSTETRICS AND GYNECOLOGY | Facility: CLINIC | Age: 58
End: 2023-06-21

## 2023-06-21 VITALS
DIASTOLIC BLOOD PRESSURE: 78 MMHG | SYSTOLIC BLOOD PRESSURE: 126 MMHG | BODY MASS INDEX: 45.22 KG/M2 | WEIGHT: 271.38 LBS | HEIGHT: 65 IN

## 2023-06-21 DIAGNOSIS — N75.1 BARTHOLIN'S GLAND ABSCESS: Primary | ICD-10-CM

## 2023-06-21 PROCEDURE — 56420 I&D BARTHOLINS GLAND ABSCESS: CPT | Mod: ,,, | Performed by: NURSE PRACTITIONER

## 2023-06-21 PROCEDURE — 87075 CULTR BACTERIA EXCEPT BLOOD: CPT | Performed by: NURSE PRACTITIONER

## 2023-06-21 PROCEDURE — 99499 UNLISTED E&M SERVICE: CPT | Mod: ,,, | Performed by: NURSE PRACTITIONER

## 2023-06-21 PROCEDURE — 99499 NO LOS: ICD-10-PCS | Mod: ,,, | Performed by: NURSE PRACTITIONER

## 2023-06-21 PROCEDURE — 87077 CULTURE AEROBIC IDENTIFY: CPT | Mod: 91 | Performed by: NURSE PRACTITIONER

## 2023-06-21 PROCEDURE — 56420 PR I&D BARTHOLIN GLAND ABSCESS: ICD-10-PCS | Mod: ,,, | Performed by: NURSE PRACTITIONER

## 2023-06-21 RX ORDER — ADJUVANT AS01B/PF, VIAL 1 OF 2
0.5 VIAL (ML) INTRAMUSCULAR ONCE
Status: ON HOLD | COMMUNITY
Start: 2023-06-07 | End: 2023-12-01 | Stop reason: HOSPADM

## 2023-06-21 RX ORDER — CLINDAMYCIN HYDROCHLORIDE 300 MG/1
300 CAPSULE ORAL EVERY 6 HOURS
Qty: 28 CAPSULE | Refills: 0 | Status: SHIPPED | OUTPATIENT
Start: 2023-06-21 | End: 2023-06-28

## 2023-06-21 RX ORDER — CELECOXIB 200 MG/1
200 CAPSULE ORAL
Status: ON HOLD | COMMUNITY
Start: 2023-06-12 | End: 2023-12-01 | Stop reason: HOSPADM

## 2023-06-21 NOTE — PROGRESS NOTES
Chief Complaint:     Chief Complaint   Patient presents with    vaginal bump     States she has been having this          HPI:   57 y.o.  white female   presents with c/o bump on the outside of vagina that has spread to the inside of vagina , states this is hurting and very tender, water burns this area and urine burns this area. Denies drainage, states she may notice blood every once and a while when touching with toilet paper. Pt previously saw Yoselyn Ventura NP, on 2023 and was prescribed Bactrim DS.     S/P KRUNAL BSO pt on estradoil 1mg & premarin cream  Last MMG 10-25-22=wnl  Last colonoscopy 2021      Current Outpatient Medications:     albuterol (PROVENTIL) 2.5 mg /3 mL (0.083 %) nebulizer solution, SMARTSI Vial(s) Via Nebulizer 4 Times Daily PRN, Disp: , Rfl:     ALPRAZolam (XANAX) 0.5 MG tablet, , Disp: , Rfl:     CELEBREX 200 mg capsule, Take 200 mg by mouth., Disp: , Rfl:     dapagliflozin (FARXIGA) 10 mg tablet, Take 10 mg by mouth every morning., Disp: , Rfl:     estradioL (ESTRACE) 1 MG tablet, Take 1 mg by mouth., Disp: , Rfl:     fluconazole (DIFLUCAN) 200 MG Tab, Take 1 tablet (200 mg total) by mouth every other day. for 4 doses, Disp: 4 tablet, Rfl: 0    gabapentin (NEURONTIN) 600 MG tablet, TAKE 1 TABLET BY MOUTH 4 TIMES DAILY, Disp: , Rfl:     lisinopriL (PRINIVIL,ZESTRIL) 20 MG tablet, , Disp: , Rfl:     lovastatin (MEVACOR) 20 MG tablet, , Disp: , Rfl:     MOUNJARO 2.5 mg/0.5 mL PnIj, Inject into the skin., Disp: , Rfl:     pantoprazole (PROTONIX) 40 mg suspension, , Disp: , Rfl:     pioglitazone (ACTOS) 30 MG tablet, Take 30 mg by mouth every morning., Disp: , Rfl:     SHINGRIX ADJUVANT COMPONENT-PF Susp, Inject 0.5 mLs into the muscle once., Disp: , Rfl:     sulfamethoxazole-trimethoprim 800-160mg (BACTRIM DS) 800-160 mg Tab, Take 1 tablet by mouth 2 (two) times daily. for 10 days, Disp: 20 tablet, Rfl: 0    vortioxetine (TRINTELLIX) 20 mg Tab, Take 20 mg by mouth., Disp: ,  "Rfl:     Review of patient's allergies indicates:   Allergen Reactions    Opioids - morphine analogues Nausea Only, Shortness Of Breath and Nausea And Vomiting    Sulfa (sulfonamide antibiotics) Hives and Rash       Social History     Tobacco Use    Smoking status: Never    Smokeless tobacco: Never   Substance Use Topics    Alcohol use: Not Currently    Drug use: Yes       Review of Systems   Constitutional:  Negative for appetite change, chills, fatigue, fever and unexpected weight change.   Gastrointestinal:  Negative for abdominal pain, blood in stool, constipation, diarrhea, nausea, vomiting and reflux.   Genitourinary:  Negative for bladder incontinence, decreased libido, dysmenorrhea, dyspareunia, dysuria, flank pain, frequency, genital sores, hematuria, hot flashes, menorrhagia, menstrual problem, pelvic pain, urgency, vaginal bleeding, vaginal discharge, vaginal pain, urinary incontinence, postcoital bleeding, postmenopausal bleeding, vaginal dryness and vaginal odor.        +vaginal bump     Integumentary:  Negative for rash, acne, hair changes and mole/lesion.   Neurological:  Negative for headaches.        Physical Exam:   Vitals:    06/21/23 1121   BP: 126/78   Weight: 123.1 kg (271 lb 6.2 oz)   Height: 5' 5" (1.651 m)       Body mass index is 45.16 kg/m².    Physical Exam  Constitutional:       Appearance: She is well-developed.   Abdominal:      General: Abdomen is flat. Bowel sounds are normal. There is no distension.      Palpations: Abdomen is soft. There is no mass.      Tenderness: There is no abdominal tenderness.      Hernia: No hernia is present.   Genitourinary:     Vagina: No vaginal discharge, erythema, tenderness or bleeding.      Cervix: No cervical motion tenderness or discharge.      Uterus: Not enlarged and not tender.       Adnexa:         Right: No mass, tenderness or fullness.          Left: No mass, tenderness or fullness.        Comments: +Bartholin's Gland Abscess " "noted  Neurological:      Mental Status: She is alert and oriented to person, place, and time.   Psychiatric:         Attention and Perception: Attention normal.         Mood and Affect: Mood normal.           Assessment:     Patient Active Problem List   Diagnosis    Acute meniscal tear of left knee    Gastroenteritis       Health Maintenance Due   Topic Date Due    Hepatitis C Screening  Never done    Lipid Panel  Never done    HIV Screening  Never done    TETANUS VACCINE  Never done    Hemoglobin A1c (Diabetic Prevention Screening)  Never done    Colorectal Cancer Screening  Never done    Shingles Vaccine (1 of 2) Never done     Health Maintenance Topics with due status: Not Due       Topic Last Completion Date    Influenza Vaccine 10/20/2021    Mammogram 10/25/2022       INCISION AND DRAINAGE    Date/Time: 6/21/2023 11:20 AM  Performed by: Rachel Hodge NP  Authorized by: Rachel Hodge NP     Time out: Immediately prior to procedure a "time out" was called to verify the correct patient, procedure, equipment, support staff and site/side marked as required.    Consent Done?:  Yes (Written)    Type:  Cyst  Body area:  Anogenital (Vagina)  Location details:  Bartholin's gland  Local anesthetic: Lidocaine 1% with epinephrine  Scalpel size:  11  Incision type:  Single straight  Complexity:  Simple  Drainage:  Pus  Drainage amount:  Moderate  Wound treatment:  Wound left open  Patient tolerance:  Patient tolerated the procedure well with no immediate complications  Pain Assessment: 0      Plan:    Cari was seen today for vaginal bump.    Diagnoses and all orders for this visit:    Bartholin's gland abscess  - abscess drained with no complications, pt tolerated well.     -Advised pt to perform warm soaks or warm compresses PRN.     - Advised pt if any pain or signs of infection were to occur to RTC for evaluation.   Culture done  Clindamycin 300 mg q 6 hours x 7 days  Other orders  -     INCISION " AND DRAINAGE

## 2023-06-25 LAB — BACTERIA SPEC CULT: ABNORMAL

## 2023-06-26 LAB
BACTERIA SPEC ANAEROBE CULT: ABNORMAL
BACTERIA SPEC ANAEROBE CULT: ABNORMAL

## 2023-06-29 ENCOUNTER — TELEPHONE (OUTPATIENT)
Dept: OBSTETRICS AND GYNECOLOGY | Facility: CLINIC | Age: 58
End: 2023-06-29
Payer: MEDICARE

## 2023-06-29 DIAGNOSIS — B37.31 VULVOVAGINAL CANDIDIASIS: Primary | ICD-10-CM

## 2023-06-29 RX ORDER — TERCONAZOLE 8 MG/G
1 CREAM VAGINAL NIGHTLY
Qty: 20 G | Refills: 0 | Status: SHIPPED | OUTPATIENT
Start: 2023-06-29 | End: 2023-07-02

## 2023-06-29 NOTE — TELEPHONE ENCOUNTER
Called pt  and left message and notified her Per SL to send Terazol 3 d/t contraindication with Diflucan/Xanax.

## 2023-06-29 NOTE — TELEPHONE ENCOUNTER
----- Message from Erin Zheng sent at 6/29/2023 10:42 AM CDT -----  Regarding: Nurse call  .Type:  Needs Medical Advice    Who Called:  patient  Symptoms (please be specific):  has itch now from antibiotics, possible yeast infection   Pharmacy name and phone #:   [facility] in [place]  Best Call Back Number:  [phone]  Additional Information:  still has a few left to take of antibiotics to take, but will need diflucan because she is starting to get a itch; everything is good, does have a small amount of blood but she thinks it may be draining

## 2023-07-27 ENCOUNTER — TELEPHONE (OUTPATIENT)
Dept: OBSTETRICS AND GYNECOLOGY | Facility: CLINIC | Age: 58
End: 2023-07-27
Payer: MEDICARE

## 2023-07-27 DIAGNOSIS — N39.0 URINARY TRACT INFECTION WITHOUT HEMATURIA, SITE UNSPECIFIED: Primary | ICD-10-CM

## 2023-07-27 RX ORDER — PHENAZOPYRIDINE HYDROCHLORIDE 200 MG/1
200 TABLET, FILM COATED ORAL 3 TIMES DAILY PRN
Qty: 30 TABLET | Refills: 0 | Status: SHIPPED | OUTPATIENT
Start: 2023-07-27 | End: 2023-08-06

## 2023-07-28 ENCOUNTER — TELEPHONE (OUTPATIENT)
Dept: OBSTETRICS AND GYNECOLOGY | Facility: CLINIC | Age: 58
End: 2023-07-28
Payer: MEDICARE

## 2023-07-28 DIAGNOSIS — R30.0 DYSURIA: Primary | ICD-10-CM

## 2023-07-28 RX ORDER — NITROFURANTOIN 25; 75 MG/1; MG/1
100 CAPSULE ORAL 2 TIMES DAILY
Qty: 14 CAPSULE | Refills: 0 | Status: SHIPPED | OUTPATIENT
Start: 2023-07-28 | End: 2023-08-04

## 2023-07-28 NOTE — TELEPHONE ENCOUNTER
Spoke with patient, previously called in on yesterday. Sts she is not having itching she is experiencing burning with urination, urge and pain after urination. Patient sts she was given abx in April for UTI. She is requesting that medication.     Per Yoselyn, Macrobid 100mg po BID x7 days. If s/s does not improve by Wednesday call office. Educated to increase fluid intake, complete abx & ED precautions given. Verbalized understanding.

## 2023-08-02 ENCOUNTER — HOSPITAL ENCOUNTER (EMERGENCY)
Facility: HOSPITAL | Age: 58
Discharge: HOME | End: 2023-08-02
Payer: MEDICARE

## 2023-08-02 VITALS
TEMPERATURE: 98 F | SYSTOLIC BLOOD PRESSURE: 187 MMHG | HEART RATE: 58 BPM | BODY MASS INDEX: 43.39 KG/M2 | OXYGEN SATURATION: 100 % | WEIGHT: 270 LBS | RESPIRATION RATE: 17 BRPM | HEIGHT: 66 IN | DIASTOLIC BLOOD PRESSURE: 74 MMHG

## 2023-08-02 DIAGNOSIS — S61.211A LACERATION OF LEFT INDEX FINGER WITHOUT FOREIGN BODY WITHOUT DAMAGE TO NAIL, INITIAL ENCOUNTER: Primary | ICD-10-CM

## 2023-08-02 PROCEDURE — 90471 IMMUNIZATION ADMIN: CPT | Performed by: NURSE PRACTITIONER

## 2023-08-02 PROCEDURE — 99284 EMERGENCY DEPT VISIT MOD MDM: CPT | Mod: 25

## 2023-08-02 PROCEDURE — 63600175 PHARM REV CODE 636 W HCPCS: Performed by: NURSE PRACTITIONER

## 2023-08-02 PROCEDURE — 90715 TDAP VACCINE 7 YRS/> IM: CPT | Performed by: NURSE PRACTITIONER

## 2023-08-02 PROCEDURE — 12001 RPR S/N/AX/GEN/TRNK 2.5CM/<: CPT

## 2023-08-02 PROCEDURE — 25000003 PHARM REV CODE 250: Performed by: NURSE PRACTITIONER

## 2023-08-02 RX ORDER — MUPIROCIN 20 MG/G
OINTMENT TOPICAL 3 TIMES DAILY
Qty: 22 G | Refills: 0 | Status: SHIPPED | OUTPATIENT
Start: 2023-08-02 | End: 2023-08-07

## 2023-08-02 RX ORDER — HYDROCODONE BITARTRATE AND ACETAMINOPHEN 10; 325 MG/1; MG/1
1 TABLET ORAL
Status: COMPLETED | OUTPATIENT
Start: 2023-08-02 | End: 2023-08-02

## 2023-08-02 RX ORDER — LIDOCAINE HYDROCHLORIDE 20 MG/ML
5 INJECTION, SOLUTION EPIDURAL; INFILTRATION; INTRACAUDAL; PERINEURAL
Status: COMPLETED | OUTPATIENT
Start: 2023-08-02 | End: 2023-08-02

## 2023-08-02 RX ORDER — CEPHALEXIN 500 MG/1
500 CAPSULE ORAL 4 TIMES DAILY
Qty: 20 CAPSULE | Refills: 0 | Status: SHIPPED | OUTPATIENT
Start: 2023-08-02 | End: 2023-08-07

## 2023-08-02 RX ADMIN — LIDOCAINE HYDROCHLORIDE 100 MG: 20 INJECTION, SOLUTION EPIDURAL; INFILTRATION; INTRACAUDAL; PERINEURAL at 05:08

## 2023-08-02 RX ADMIN — TETANUS TOXOID, REDUCED DIPHTHERIA TOXOID AND ACELLULAR PERTUSSIS VACCINE, ADSORBED 0.5 ML: 5; 2.5; 8; 8; 2.5 SUSPENSION INTRAMUSCULAR at 05:08

## 2023-08-02 RX ADMIN — HYDROCODONE BITARTRATE AND ACETAMINOPHEN 1 TABLET: 10; 325 TABLET ORAL at 07:08

## 2023-08-02 RX ADMIN — BACITRACIN ZINC, NEOMYCIN, POLYMYXIN B SULFAT 1 EACH: 5000; 3.5; 4 OINTMENT TOPICAL at 06:08

## 2023-08-02 NOTE — ED PROVIDER NOTES
Encounter Date: 8/2/2023       History     Chief Complaint   Patient presents with    Laceration     TO ED VIA EMT WITH C/O LACERATION / PAIN TO LEFT POINTER. PT WAS USING A CHAIN SAW AND SHE CUT HER FINGER. LAST TETANUS SHOT WAS OVER 10YRS AGO.      TO ED VIA EMT WITH C/O LACERATION / PAIN TO LEFT POINTER. PT WAS USING A CHAIN SAW AND SHE CUT HER FINGER. LAST TETANUS SHOT WAS OVER 10YRS AGO.         Review of patient's allergies indicates:   Allergen Reactions    Opioids - morphine analogues Nausea Only, Shortness Of Breath and Nausea And Vomiting    Sulfa (sulfonamide antibiotics) Hives and Rash     Past Medical History:   Diagnosis Date    Anxiety     Depression     Digestive disorder     GERD (gastroesophageal reflux disease)     High cholesterol     Hypertension      Past Surgical History:   Procedure Laterality Date    CARPAL TUNNEL RELEASE Bilateral     CHOLECYSTECTOMY      HYSTERECTOMY      KNEE ARTHROSCOPY W/ ACL RECONSTRUCTION Right     KNEE ARTHROSCOPY W/ MENISCECTOMY Right     KNEE ARTHROSCOPY W/ MENISCECTOMY Left 2/28/2023    Procedure: ARTHROSCOPY, KNEE, WITH MENISCECTOMY;  Surgeon: Virgilio Haynes MD;  Location: [facility];  Service: Orthopedics;  Laterality: Left;    LIPOMA RESECTION      Abdomen    LIPOMA RESECTION      Right Thigh     Family History   Problem Relation Age of Onset    COPD Mother     Hypertension Mother     Colon cancer Father     Hypertension Father     Lung cancer Father      Social History     Tobacco Use    Smoking status: Never    Smokeless tobacco: Never   Substance Use Topics    Alcohol use: Not Currently    Drug use: Never     Review of Systems   Skin:         Lt index finger laceration 1 cm x 0.3 cm V-shaped flap   All other systems reviewed and are negative.      Physical Exam     Initial Vitals [08/02/23 1651]   BP Pulse Resp Temp SpO2   134/85 79 20 98.1 °F (36.7 °C) 97 %      MAP       --         Physical Exam    Nursing note and vitals reviewed.  Constitutional: She  appears well-developed and well-nourished.   HENT:   Head: Normocephalic and atraumatic.   Eyes: EOM are normal. Pupils are equal, round, and reactive to light.   Neck: Neck supple.   Normal range of motion.  Cardiovascular:  Normal rate.           Pulmonary/Chest: No respiratory distress.   Musculoskeletal:         General: Normal range of motion.      Cervical back: Normal range of motion and neck supple.     Neurological: She is alert and oriented to person, place, and time.   Skin: Skin is warm and dry. Capillary refill takes less than 2 seconds.   Lt index finger laceration 1 cm x 0.3 cm V-shaped flap   Psychiatric: She has a normal mood and affect. Her behavior is normal. Judgment and thought content normal.         ED Course   Lac Repair    Date/Time: 8/2/2023 4:48 PM    Performed by: SALBADOR Merchant  Authorized by: SALBADOR Merchant    Consent:     Consent obtained:  Verbal    Consent given by:  Patient    Risks, benefits, and alternatives were discussed: yes      Risks discussed:  Infection and pain    Alternatives discussed:  No treatment and delayed treatment  Universal protocol:     Procedure explained and questions answered to patient or proxy's satisfaction: yes      Patient identity confirmed:  Verbally with patient  Anesthesia:     Anesthesia method:  Local infiltration    Local anesthetic:  Lidocaine 2% w/o epi  Laceration details:     Location:  Finger    Finger location:  L index finger    Length (cm):  1    Depth (mm):  0.3  Pre-procedure details:     Preparation:  Patient was prepped and draped in usual sterile fashion  Exploration:     Limited defect created (wound extended): no      Contaminated: no    Treatment:     Area cleansed with:  Povidone-iodine    Amount of cleaning:  Extensive    Irrigation solution:  Sterile water    Irrigation method:  Pressure wash and syringe    Visualized foreign bodies/material removed: no      Debridement:  None    Undermining:  None    Scar revision: no     Skin repair:     Repair method:  Sutures    Suture size:  4-0    Wound skin closure material used: vicryl.    Suture technique:  Simple interrupted    Number of sutures:  7  Approximation:     Approximation:  Close  Repair type:     Repair type:  Simple  Post-procedure details:     Dressing:  Antibiotic ointment and adhesive bandage    Procedure completion:  Tolerated    Labs Reviewed - No data to display       Imaging Results              X-Ray Finger 2 or More Views Left (Preliminary result)  Result time 08/02/23 18:44:18      Wet Read by SALBADOR Merchant (08/02/23 18:44:18, [facility])    No acute fracture                                     Medications   Tdap (BOOSTRIX) vaccine injection 0.5 mL (0.5 mLs Intramuscular Given 8/2/23 1715)   LIDOcaine (PF) 20 mg/mL (2%) injection 100 mg (100 mg Infiltration Given 8/2/23 1722)   neomycin-bacitracnZn-polymyxnB packet (1 each Topical (Top) Given 8/2/23 1856)   HYDROcodone-acetaminophen  mg per tablet 1 tablet (1 tablet Oral Given 8/2/23 1900)                              Clinical Impression:   Final diagnoses:  [S61.211A] Laceration of left index finger without foreign body without damage to nail, initial encounter (Primary)        ED Disposition Condition    Discharge Stable          ED Prescriptions       Medication Sig Dispense Start Date End Date Auth. Provider    mupirocin (BACTROBAN) 2 % ointment Apply topically 3 (three) times daily. for 5 days 22 g 8/2/2023 8/7/2023 SALBADOR Merchant    cephALEXin (KEFLEX) 500 MG capsule Take 1 capsule (500 mg total) by mouth 4 (four) times daily. for 5 days 20 capsule 8/2/2023 8/7/2023 SALBADOR Merchant          Follow-up Information       Follow up With Specialties Details Why Contact Info    Jon Fox MD Internal Medicine, General Practice  As needed [address] [facility] [id number] [phone]               SALBADOR Merchant  08/02/23  1936

## 2023-09-11 ENCOUNTER — OFFICE VISIT (OUTPATIENT)
Dept: OBSTETRICS AND GYNECOLOGY | Facility: CLINIC | Age: 58
End: 2023-09-11
Payer: MEDICARE

## 2023-09-11 VITALS
BODY MASS INDEX: 43.29 KG/M2 | DIASTOLIC BLOOD PRESSURE: 72 MMHG | TEMPERATURE: 97 F | HEIGHT: 65 IN | SYSTOLIC BLOOD PRESSURE: 162 MMHG | WEIGHT: 259.81 LBS

## 2023-09-11 DIAGNOSIS — N60.12 FIBROCYSTIC BREAST CHANGES, BILATERAL: ICD-10-CM

## 2023-09-11 DIAGNOSIS — Z79.890 HORMONE REPLACEMENT THERAPY: ICD-10-CM

## 2023-09-11 DIAGNOSIS — Z01.411 ABNORMAL GYNECOLOGICAL EXAMINATION: ICD-10-CM

## 2023-09-11 DIAGNOSIS — R03.0 ELEVATED BLOOD PRESSURE READING: ICD-10-CM

## 2023-09-11 DIAGNOSIS — N60.11 FIBROCYSTIC BREAST CHANGES, BILATERAL: ICD-10-CM

## 2023-09-11 DIAGNOSIS — Z12.31 ENCOUNTER FOR SCREENING MAMMOGRAM FOR MALIGNANT NEOPLASM OF BREAST: Primary | ICD-10-CM

## 2023-09-11 DIAGNOSIS — Z80.0 FAMILY HISTORY OF COLON CANCER IN FATHER: ICD-10-CM

## 2023-09-11 PROCEDURE — G0101 CA SCREEN;PELVIC/BREAST EXAM: HCPCS | Mod: ,,, | Performed by: NURSE PRACTITIONER

## 2023-09-11 PROCEDURE — G0101 PR CA SCREEN;PELVIC/BREAST EXAM: ICD-10-PCS | Mod: ,,, | Performed by: NURSE PRACTITIONER

## 2023-09-11 RX ORDER — PANTOPRAZOLE SODIUM 40 MG/1
40 TABLET, DELAYED RELEASE ORAL DAILY
COMMUNITY
Start: 2023-06-25

## 2023-09-11 RX ORDER — TIRZEPATIDE 5 MG/.5ML
5 INJECTION, SOLUTION SUBCUTANEOUS
COMMUNITY
Start: 2023-09-06 | End: 2024-03-14

## 2023-09-11 NOTE — PROGRESS NOTES
Chief Complaint:   Well Woman     History of Present Illness:  Cari Farmer is a 57 y.o. year old  here for her annual exam status post hysterectomy. No vaginal bleeding following hysterectomy. Denies any health changes. States she ran out of her estradiol last week and noticed no difference. Denies any hot flashes or night sweats.    +family history of colon cancer in father. Denies any family history of female cancers.      MENOPAUSAL:  Age at menarche: 10  Age at menopause: 32  Hysterectomy:  Yes, KRUNAL BSO  Last pap: age 32 per Pt  H/o abnl pap: no  Postcoital Bleeding: No  Sexually Active: no   Dyspareunia: No  H/o STI: No   HRT: yes, estradiol 1 mg and premarin cream  MMG: 10/25/22 Birads 2  H/o abnl MMG: no   Colonoscopy: 2021      Review of Systems:  General/Constitutional: Chills denies. Fatigue/weakness denies. Fever denies. Night sweats denies. Hot flashes denies    Respiratory: Cough denies. Hemoptysis denies. SOB denies. Sputum production denies. Wheezing denies .   Cardiovascular: Chest pain denies. Dizziness denies. Palpitations denies. Swelling in hands/feet denies    Gastrointestinal: Abdominal pain denies. Blood in stool denies. Constipation denies. Diarrhea denies. Heartburn denies. Nausea denies. Vomiting denies    Genitourinary: Incontinence denies. Blood in urine denies. Frequent urination denies. Painful urination denies. Urinary urgency denies. Nocturia denies    Gynecologic: Irregular menses denies. Heavy bleeding denies. Painful menses denies. Vaginal discharge denies. Vaginal odor denies. Vaginal itching denies. Vaginal lesion denies. Pelvic pain denies. Decreased libido denies. Vulvar lesion denies. Prolapse of genital organs denies. Painful intercourse denies. Postcoital bleeding denies    Psychiatric: Depression denies. Anxiety denies     OB History    Para Term  AB Living   0 0 0 0 0 0   SAB IAB Ectopic Multiple Live Births   0 0 0 0 0      The patient has  never been pregnant.    Past Medical History:   Diagnosis Date    Anxiety     Depression     Digestive disorder     GERD (gastroesophageal reflux disease)     High cholesterol     Hypertension        Current Outpatient Medications:     albuterol (PROVENTIL) 2.5 mg /3 mL (0.083 %) nebulizer solution, SMARTSI Vial(s) Via Nebulizer 4 Times Daily PRN, Disp: , Rfl:     ALPRAZolam (XANAX) 0.5 MG tablet, , Disp: , Rfl:     dapagliflozin (FARXIGA) 10 mg tablet, Take 10 mg by mouth every morning., Disp: , Rfl:     estradioL (ESTRACE) 1 MG tablet, Take 1 mg by mouth., Disp: , Rfl:     gabapentin (NEURONTIN) 600 MG tablet, TAKE 1 TABLET BY MOUTH 4 TIMES DAILY, Disp: , Rfl:     lisinopriL (PRINIVIL,ZESTRIL) 20 MG tablet, , Disp: , Rfl:     lovastatin (MEVACOR) 20 MG tablet, , Disp: , Rfl:     MOUNJARO 5 mg/0.5 mL PnIj, Inject into the skin., Disp: , Rfl:     pantoprazole (PROTONIX) 40 MG tablet, Take 40 mg by mouth., Disp: , Rfl:     pioglitazone (ACTOS) 30 MG tablet, Take 30 mg by mouth every morning., Disp: , Rfl:     SHINGRIX ADJUVANT COMPONENT-PF Susp, Inject 0.5 mLs into the muscle once., Disp: , Rfl:     vortioxetine (TRINTELLIX) 20 mg Tab, Take 20 mg by mouth., Disp: , Rfl:     CELEBREX 200 mg capsule, Take 200 mg by mouth., Disp: , Rfl:     MOUNJARO 2.5 mg/0.5 mL PnIj, Inject into the skin., Disp: , Rfl:     pantoprazole (PROTONIX) 40 mg suspension, , Disp: , Rfl:     Review of patient's allergies indicates:   Allergen Reactions    Opioids - morphine analogues Nausea Only, Shortness Of Breath and Nausea And Vomiting    Sulfa (sulfonamide antibiotics) Hives and Rash     Past Surgical History:   Procedure Laterality Date    CARPAL TUNNEL RELEASE Bilateral     CHOLECYSTECTOMY      KNEE ARTHROSCOPY W/ ACL RECONSTRUCTION Right     KNEE ARTHROSCOPY W/ MENISCECTOMY Right     KNEE ARTHROSCOPY W/ MENISCECTOMY Left 2023    Procedure: ARTHROSCOPY, KNEE, WITH MENISCECTOMY;  Surgeon: Virgilio Haynes MD;  Location: [facility]  "OR;  Service: Orthopedics;  Laterality: Left;    LIPOMA RESECTION      Abdomen    LIPOMA RESECTION      Right Thigh    TOTAL ABDOMINAL HYSTERECTOMY W/ BILATERAL SALPINGOOPHORECTOMY       Family History   Problem Relation Age of Onset    Colon cancer Father     Hypertension Father     Lung cancer Father     COPD Mother     Hypertension Mother     Breast cancer Neg Hx     Ovarian cancer Neg Hx     Uterine cancer Neg Hx     Cervical cancer Neg Hx      Social History     Socioeconomic History    Marital status:    Tobacco Use    Smoking status: Never     Passive exposure: Never    Smokeless tobacco: Never   Substance and Sexual Activity    Alcohol use: Not Currently    Drug use: Never    Sexual activity: Not Currently     Comment:        Physical Exam:  BP (!) 162/72 (BP Location: Left arm, Patient Position: Sitting, BP Method: Large (Manual))   Temp 96.6 °F (35.9 °C) (Temporal)   Ht 5' 5" (1.651 m)   Wt 117.8 kg (259 lb 12.8 oz)   BMI 43.23 kg/m²     Chaperone: present.       General appearance: healthy, well-nourished and well-developed     Psychiatric: Orientation to time, place and person. Normal mood and affect and active, alert     Skin: Appearance: no rashes or lesions.     Neck:   Neck: supple, FROM, trachea midline. and no masses   Thyroid: no enlargement or nodules and non-tender.       Cardiovascular:   Auscultation: RRR and no murmur.   Peripheral Vascular: no varicosities, LLE edema, RLE edema, calf tenderness, and palpable cord and pedal pulses intact.     Lungs:   Respiratory effort: no intercostal retractions or accessory muscle usage.   Auscultation: no wheezing, rales/crackles, or rhonchi and clear to auscultation.     Breast:   Inspection/Palpation: no tenderness, discrete/distinct masses, skin changes, or abnormal secretions. Nipple appearance normal. +bilateral fibrocystic changes    Abdomen:   Auscultation/Inspection/Palpation: no hepatomegaly, splenomegaly, masses, tenderness or " CVA tenderness and soft, non-distended bowel sounds preset.    Hernia: no palpable hernias.     Female Genitalia:    Vulva: no masses, tenderness or lesions    Bladder/Urethra: no urethral discharge or mass, normal meatus, bladder non-distended.    Vagina: no tenderness, erythema, cystocele, rectocele, abnormal vaginal discharge or vesicle(s) or ulcers    Cuff intact, no masses, NT   Adnexa/Parametria: no parametrial tenderness or mass, no adnexal tenderness or ovarian mass.     Lymph Nodes:   Palpation: non tender submandibular nodes, axillary nodes, or inguinal nodes.     Rectal Exam:   Rectum: normal perianal skin.       Assessment/Plan:  1. Abnormal gynecological examination  No pap, s/p hyst, no h/o abnl pap   Advised patient if she notices any changes to her breasts, including a lump, mass, dimpling, discharge, rash or tenderness, to should contact us immediately   Healthy diet, exercise   MMG  Multivitamin   Seat belt   Sunscreen use   Safe sex/ STI education   Annual labs: w/ PCP, Dr. Fox  C-scope: 2021, negative per pt    2. Fibrocystic breast changes, bilateral  Discussed recommendations of annual screening after age of 40 with mammogram and MRI for patients with lifetime risk greater than 25%       Explained that screening is not 100% reliable. Advised patient if she notices any changes to her breast including a lump, mass, dimpling, discharge, rash, or tenderness she should contact us immediately.       Recommend BSE monthly     3. Elevated blood pressure reading/hypertension  Educated  Recommended to follow up with Dr. Fox    4. Hormone replacement replacement  Educated  Has been out of medication for a week, noticed no difference    Discussed increased risk of CVA, MI, TE event and breast cancer. Advised patient to discontinue. Patient with PMH of hypertension.     Reviewed precautions when taking female hormones and symptoms to be aware of such as headache, visual change, focal weakness or  numbness, SOB,chest pain, pain in thigh or calf, breast pain or lump, and vaginal bleeding. Instructed to call immediately and stop HRT if any of these symptoms occur.       Discussed sexuality.     Answered questions.    Will discontinue estrogen, advised to call if any symptoms     5. Family history of colon cancer in father  Educated  Advised to continue colonoscopy

## 2023-09-28 ENCOUNTER — HOSPITAL ENCOUNTER (EMERGENCY)
Facility: HOSPITAL | Age: 58
Discharge: HOME | End: 2023-09-28
Attending: FAMILY MEDICINE
Payer: MEDICARE

## 2023-09-28 VITALS
OXYGEN SATURATION: 100 % | HEART RATE: 66 BPM | HEIGHT: 65 IN | BODY MASS INDEX: 42.65 KG/M2 | RESPIRATION RATE: 18 BRPM | TEMPERATURE: 99 F | SYSTOLIC BLOOD PRESSURE: 174 MMHG | WEIGHT: 256 LBS | DIASTOLIC BLOOD PRESSURE: 87 MMHG

## 2023-09-28 DIAGNOSIS — S90.02XA CONTUSION OF LEFT ANKLE, INITIAL ENCOUNTER: Primary | ICD-10-CM

## 2023-09-28 PROCEDURE — 99283 EMERGENCY DEPT VISIT LOW MDM: CPT

## 2023-09-28 PROCEDURE — 25000003 PHARM REV CODE 250: Performed by: FAMILY MEDICINE

## 2023-09-28 RX ORDER — IBUPROFEN 400 MG/1
800 TABLET ORAL
Status: COMPLETED | OUTPATIENT
Start: 2023-09-28 | End: 2023-09-28

## 2023-09-28 RX ADMIN — IBUPROFEN 800 MG: 400 TABLET, FILM COATED ORAL at 07:09

## 2023-09-28 NOTE — ED PROVIDER NOTES
"Encounter Date: 9/28/2023       History     Chief Complaint   Patient presents with    left foot pain     Pt reports "I was walking out my house yesterday and the screen door caught my foot. Now I get a shooting pain when I walk." Pt presents with bruising, swelling, and laceration to left lateral posterior foot. +ROM. No obvious deformities noted. Pt capillary refill <3. Pt acyanotic. No acute distress noted.        Patient presents with a chief complaint of left foot pain secondary to an injury yesterday.  A storm door closed on her foot.  She complains of Achilles and posterior heel pain.  She reports no fevers chills or sweats.        Review of patient's allergies indicates:   Allergen Reactions    Opioids - morphine analogues Nausea Only, Shortness Of Breath and Nausea And Vomiting    Sulfa (sulfonamide antibiotics) Hives and Rash     Past Medical History:   Diagnosis Date    Anxiety     Depression     Digestive disorder     GERD (gastroesophageal reflux disease)     High cholesterol     Hypertension      Past Surgical History:   Procedure Laterality Date    CARPAL TUNNEL RELEASE Bilateral     CHOLECYSTECTOMY      KNEE ARTHROSCOPY W/ ACL RECONSTRUCTION Right     KNEE ARTHROSCOPY W/ MENISCECTOMY Right     KNEE ARTHROSCOPY W/ MENISCECTOMY Left 02/28/2023    Procedure: ARTHROSCOPY, KNEE, WITH MENISCECTOMY;  Surgeon: Virgilio Haynes MD;  Location: [facility];  Service: Orthopedics;  Laterality: Left;    LIPOMA RESECTION      Abdomen    LIPOMA RESECTION      Right Thigh    TOTAL ABDOMINAL HYSTERECTOMY W/ BILATERAL SALPINGOOPHORECTOMY       Family History   Problem Relation Age of Onset    Colon cancer Father     Hypertension Father     Lung cancer Father     COPD Mother     Hypertension Mother     Breast cancer Neg Hx     Ovarian cancer Neg Hx     Uterine cancer Neg Hx     Cervical cancer Neg Hx      Social History     Tobacco Use    Smoking status: Never     Passive exposure: Never    Smokeless tobacco: Never "   Substance Use Topics    Alcohol use: Not Currently    Drug use: Never     Review of Systems   Constitutional: Negative.    Respiratory: Negative.     Musculoskeletal:         Left posterior heel/Achilles pain   Skin:         Abrasion ecchymosis       Physical Exam     Initial Vitals [09/28/23 0719]   BP Pulse Resp Temp SpO2   (!) 185/85 67 18 98.9 °F (37.2 °C) 99 %      MAP       --         Physical Exam    Constitutional: She appears well-developed and well-nourished.   Cardiovascular:  Normal rate, regular rhythm and normal heart sounds.           Pulmonary/Chest: Breath sounds normal.   Musculoskeletal:      Comments: Tenderness to the left posterior heel.  No ligamentous disruption is appreciated.  DP is 2+     Skin:   Left Achilles superficial abrasion, minimal surrounding erythema, no induration or fluctuance.  There is associated bruising noted.         ED Course   Procedures  Labs Reviewed - No data to display       Imaging Results              X-Ray Foot Complete Left (Final result)  Result time 09/28/23 08:09:59      Final result by Mirna Morales III, MD (09/28/23 08:09:59)                   Impression:      1. Chronic changes are present as described above.      Electronically signed by: Mirna Morales  Date:    09/28/2023  Time:    08:09               Narrative:    EXAMINATION:  STUDY: XR FOOT COMPLETE 3 VIEW LEFT    CLINICAL HISTORY AND TECHNIQUE:  Johnnie Mcgarry RT on 9/28/2023  7:34 AMCURRENT CLINICAL HISTORY: ER PT  X 1 DAY  -C/O LEFT FOOT PAIN/SWELLING/ BRUISING S/P SCREEN DOOR HITTING HER FOOT    PAST MEDICAL HISTORY: HIGH CHOLESTEROL, HTN, HYSTO    TECHNIQUE: 3V LEFT FOOT    TECHNOLOGIST: NN    TRANSPORT OK    COMPARISON:  None    FINDINGS:  Fairly prominent calcaneal spurring is noted at the site of insertion of the Achilles tendon and site of origin of the plantar fascia.  I see no definite fractures, dislocations, or other significant abnormalities.                                        Medications   ibuprofen tablet 800 mg (800 mg Oral Given 9/28/23 0734)     Medical Decision Making  Amount and/or Complexity of Data Reviewed  Radiology: ordered.    Risk  Prescription drug management.      Additional MDM:   Differential Diagnosis:   Differential diagnosis:  Calcaneal fracture, Achilles tendon sprain, Achilles tendon disruption.  Contusion                             Clinical Impression:   Final diagnoses:  [S90.02XA] Contusion of left ankle, initial encounter (Primary)        ED Disposition Condition    Discharge Stable          ED Prescriptions    None       Follow-up Information       Follow up With Specialties Details Why Contact Info    Jon Fox MD Internal Medicine, General Practice In 1 week  [address] [facility] [place]  [address] [facility] [address]  [phone]               Dwain Clemons MD  09/28/23 4931

## 2023-09-28 NOTE — ED NOTES
Pt seen and evaluated by Dr. Clemons. Pt has no complaints at this time and verbalized understanding of education. Pt ambulated out of ER with steady gait.  No acute distress noted. See provider notes.

## 2023-10-13 ENCOUNTER — HOSPITAL ENCOUNTER (OUTPATIENT)
Dept: RADIOLOGY | Facility: HOSPITAL | Age: 58
Discharge: HOME | End: 2023-10-13
Attending: INTERNAL MEDICINE
Payer: MEDICARE

## 2023-10-13 DIAGNOSIS — M25.559 HIP PAIN: ICD-10-CM

## 2023-10-13 PROCEDURE — 73721 MRI JNT OF LWR EXTRE W/O DYE: CPT | Mod: TC,LT

## 2023-10-30 ENCOUNTER — HOSPITAL ENCOUNTER (OUTPATIENT)
Dept: RADIOLOGY | Facility: HOSPITAL | Age: 58
Discharge: HOME | End: 2023-10-30
Attending: NURSE PRACTITIONER
Payer: MEDICARE

## 2023-10-30 DIAGNOSIS — Z12.31 ENCOUNTER FOR SCREENING MAMMOGRAM FOR MALIGNANT NEOPLASM OF BREAST: ICD-10-CM

## 2023-10-30 PROCEDURE — 77067 SCR MAMMO BI INCL CAD: CPT | Mod: TC

## 2023-11-21 ENCOUNTER — HOSPITAL ENCOUNTER (OUTPATIENT)
Dept: PREADMISSION TESTING | Facility: HOSPITAL | Age: 58
Discharge: HOME | End: 2023-11-21
Attending: SURGERY
Payer: MEDICARE

## 2023-11-21 ENCOUNTER — HOSPITAL ENCOUNTER (OUTPATIENT)
Dept: RADIOLOGY | Facility: HOSPITAL | Age: 58
Discharge: HOME | End: 2023-11-21
Attending: SURGERY
Payer: MEDICARE

## 2023-11-21 VITALS — HEIGHT: 65 IN | WEIGHT: 256 LBS | BODY MASS INDEX: 42.65 KG/M2

## 2023-11-21 DIAGNOSIS — Z01.818 PRE-OP TESTING: Primary | ICD-10-CM

## 2023-11-21 DIAGNOSIS — Z01.818 PRE-OP TESTING: ICD-10-CM

## 2023-11-21 LAB
ALBUMIN SERPL-MCNC: 4.6 G/DL (ref 3.4–5)
ALBUMIN/GLOB SERPL: 1.6 RATIO
ALP SERPL-CCNC: 88 UNIT/L (ref 50–144)
ALT SERPL-CCNC: 24 UNIT/L (ref 1–45)
ANION GAP SERPL CALC-SCNC: 8 MEQ/L (ref 2–13)
AST SERPL-CCNC: 29 UNIT/L (ref 14–36)
BASOPHILS # BLD AUTO: 0.08 X10(3)/MCL (ref 0.01–0.08)
BASOPHILS NFR BLD AUTO: 1.1 % (ref 0.1–1.2)
BILIRUB SERPL-MCNC: 0.5 MG/DL (ref 0–1)
BUN SERPL-MCNC: 12 MG/DL (ref 7–20)
CALCIUM SERPL-MCNC: 9.8 MG/DL (ref 8.4–10.2)
CHLORIDE SERPL-SCNC: 103 MMOL/L (ref 98–110)
CO2 SERPL-SCNC: 29 MMOL/L (ref 21–32)
CREAT SERPL-MCNC: 0.6 MG/DL (ref 0.66–1.25)
CREAT/UREA NIT SERPL: 20 (ref 12–20)
EOSINOPHIL # BLD AUTO: 0.28 X10(3)/MCL (ref 0.04–0.36)
EOSINOPHIL NFR BLD AUTO: 3.8 % (ref 0.7–7)
ERYTHROCYTE [DISTWIDTH] IN BLOOD BY AUTOMATED COUNT: 13.9 % (ref 11–14.5)
GFR SERPLBLD CREATININE-BSD FMLA CKD-EPI: >90 MLS/MIN/1.73/M2
GLOBULIN SER-MCNC: 2.9 GM/DL (ref 2–3.9)
GLUCOSE SERPL-MCNC: 90 MG/DL (ref 70–115)
HCT VFR BLD AUTO: 42.3 % (ref 36–48)
HGB BLD-MCNC: 13.8 G/DL (ref 11.8–16)
IMM GRANULOCYTES # BLD AUTO: 0.01 X10(3)/MCL (ref 0–0.03)
IMM GRANULOCYTES NFR BLD AUTO: 0.1 % (ref 0–0.5)
INR PPP: 1
LYMPHOCYTES # BLD AUTO: 2.08 X10(3)/MCL (ref 1.16–3.74)
LYMPHOCYTES NFR BLD AUTO: 28 % (ref 20–55)
MCH RBC QN AUTO: 28.2 PG (ref 27–34)
MCHC RBC AUTO-ENTMCNC: 32.6 G/DL (ref 31–37)
MCV RBC AUTO: 86.3 FL (ref 79–99)
MONOCYTES # BLD AUTO: 0.44 X10(3)/MCL (ref 0.24–0.36)
MONOCYTES NFR BLD AUTO: 5.9 % (ref 4.7–12.5)
NEUTROPHILS # BLD AUTO: 4.54 X10(3)/MCL (ref 1.56–6.13)
NEUTROPHILS NFR BLD AUTO: 61.1 % (ref 37–73)
NRBC BLD AUTO-RTO: 0 %
PLATELET # BLD AUTO: 367 X10(3)/MCL (ref 140–371)
PMV BLD AUTO: 9.7 FL (ref 9.4–12.4)
POTASSIUM SERPL-SCNC: 3.8 MMOL/L (ref 3.5–5.1)
PROT SERPL-MCNC: 7.5 GM/DL (ref 6.3–8.2)
PROTHROMBIN TIME: 9.9 SECONDS (ref 9.3–11.9)
RBC # BLD AUTO: 4.9 X10(6)/MCL (ref 4–5.1)
SODIUM SERPL-SCNC: 140 MMOL/L (ref 135–145)
WBC # SPEC AUTO: 7.43 X10(3)/MCL (ref 4–11.5)

## 2023-11-21 PROCEDURE — 93010 ELECTROCARDIOGRAM REPORT: CPT | Mod: ,,, | Performed by: INTERNAL MEDICINE

## 2023-11-21 PROCEDURE — 71046 X-RAY EXAM CHEST 2 VIEWS: CPT | Mod: TC

## 2023-11-21 PROCEDURE — 85025 COMPLETE CBC W/AUTO DIFF WBC: CPT | Performed by: SURGERY

## 2023-11-21 PROCEDURE — 93005 ELECTROCARDIOGRAM TRACING: CPT

## 2023-11-21 PROCEDURE — 93010 EKG 12-LEAD: ICD-10-PCS | Mod: ,,, | Performed by: INTERNAL MEDICINE

## 2023-11-21 PROCEDURE — 85610 PROTHROMBIN TIME: CPT | Performed by: SURGERY

## 2023-11-21 PROCEDURE — 80053 COMPREHEN METABOLIC PANEL: CPT | Performed by: SURGERY

## 2023-11-21 RX ORDER — CEFAZOLIN SODIUM IN 0.9 % NACL 2 G/100 ML
2 PLASTIC BAG, INJECTION (ML) INTRAVENOUS
Status: CANCELLED | OUTPATIENT
Start: 2023-12-01

## 2023-11-21 NOTE — DISCHARGE INSTRUCTIONS

## 2023-11-30 ENCOUNTER — ANESTHESIA EVENT (OUTPATIENT)
Dept: SURGERY | Facility: HOSPITAL | Age: 58
End: 2023-11-30
Payer: MEDICARE

## 2023-11-30 NOTE — ANESTHESIA PREPROCEDURE EVALUATION
11/30/2023  Cari Farmer is a 57 y.o., female.      Pre-op Assessment    I have reviewed the Patient Summary Reports.     I have reviewed the Nursing Notes. I have reviewed the NPO Status.   I have reviewed the Medications.     Review of Systems  Anesthesia Hx:  No problems with previous Anesthesia             Denies Family Hx of Anesthesia complications.    Denies Personal Hx of Anesthesia complications.                    Hematology/Oncology:  Hematology Normal   Oncology Normal                                   EENT/Dental:  EENT/Dental Normal           Cardiovascular:  Exercise tolerance: poor   Hypertension           hyperlipidemia                             Pulmonary:  Pulmonary Normal                       Renal/:  Renal/ Normal                 Hepatic/GI:     GERD             Musculoskeletal:  Musculoskeletal Normal                Neurological:  Neurology Normal                                      Endocrine:  Endocrine Normal          Morbid Obesity / BMI > 40  Dermatological:  Skin Normal    Psych:  Psychiatric History anxiety depression                Physical Exam  General: Well nourished, Cooperative, Alert and Oriented    Airway:  Mallampati: II / II  Mouth Opening: Normal  TM Distance: Normal  Tongue: Normal  Neck ROM: Normal ROM    Dental:  Intact        Anesthesia Plan  Type of Anesthesia, risks & benefits discussed:    Anesthesia Type: MAC, Gen ETT  Intra-op Monitoring Plan: Standard ASA Monitors  Post Op Pain Control Plan: multimodal analgesia  Induction:  IV  Informed Consent: Informed consent signed with the Patient and all parties understand the risks and agree with anesthesia plan.  All questions answered. Patient consented to blood products? Yes  ASA Score: 3  Day of Surgery Review of History & Physical: H&P Update referred to the surgeon/provider.I have interviewed and examined  the patient. I have reviewed the patient's H&P dated: There are no significant changes.     Ready For Surgery From Anesthesia Perspective.     .

## 2023-12-01 ENCOUNTER — ANESTHESIA (OUTPATIENT)
Dept: SURGERY | Facility: HOSPITAL | Age: 58
End: 2023-12-01
Payer: MEDICARE

## 2023-12-01 ENCOUNTER — HOSPITAL ENCOUNTER (OUTPATIENT)
Facility: HOSPITAL | Age: 58
Discharge: HOME | End: 2023-12-01
Attending: SURGERY | Admitting: SURGERY
Payer: MEDICARE

## 2023-12-01 VITALS
OXYGEN SATURATION: 98 % | BODY MASS INDEX: 42.65 KG/M2 | HEART RATE: 60 BPM | WEIGHT: 256 LBS | RESPIRATION RATE: 18 BRPM | TEMPERATURE: 97 F | DIASTOLIC BLOOD PRESSURE: 74 MMHG | SYSTOLIC BLOOD PRESSURE: 156 MMHG | HEIGHT: 65 IN

## 2023-12-01 DIAGNOSIS — Z01.818 PRE-OP TESTING: ICD-10-CM

## 2023-12-01 DIAGNOSIS — D17.24 LIPOMA OF LEFT LOWER EXTREMITY: Primary | ICD-10-CM

## 2023-12-01 LAB — POCT GLUCOSE: 116 MG/DL (ref 70–110)

## 2023-12-01 PROCEDURE — 37000009 HC ANESTHESIA EA ADD 15 MINS: Performed by: SURGERY

## 2023-12-01 PROCEDURE — 25000003 PHARM REV CODE 250: Performed by: NURSE ANESTHETIST, CERTIFIED REGISTERED

## 2023-12-01 PROCEDURE — 25000003 PHARM REV CODE 250: Performed by: SURGERY

## 2023-12-01 PROCEDURE — D9220A PRA ANESTHESIA: ICD-10-PCS | Mod: ,,, | Performed by: NURSE ANESTHETIST, CERTIFIED REGISTERED

## 2023-12-01 PROCEDURE — 71000015 HC POSTOP RECOV 1ST HR: Performed by: SURGERY

## 2023-12-01 PROCEDURE — 71000016 HC POSTOP RECOV ADDL HR: Performed by: SURGERY

## 2023-12-01 PROCEDURE — D9220A PRA ANESTHESIA: Mod: ,,, | Performed by: NURSE ANESTHETIST, CERTIFIED REGISTERED

## 2023-12-01 PROCEDURE — 82962 GLUCOSE BLOOD TEST: CPT | Performed by: SURGERY

## 2023-12-01 PROCEDURE — 63600175 PHARM REV CODE 636 W HCPCS: Performed by: NURSE ANESTHETIST, CERTIFIED REGISTERED

## 2023-12-01 PROCEDURE — 36000707: Performed by: SURGERY

## 2023-12-01 PROCEDURE — 27201423 OPTIME MED/SURG SUP & DEVICES STERILE SUPPLY: Performed by: SURGERY

## 2023-12-01 PROCEDURE — S5010 5% DEXTROSE AND 0.45% SALINE: HCPCS | Performed by: SURGERY

## 2023-12-01 PROCEDURE — 63600175 PHARM REV CODE 636 W HCPCS: Performed by: SURGERY

## 2023-12-01 PROCEDURE — 88304 TISSUE EXAM BY PATHOLOGIST: CPT | Performed by: SURGERY

## 2023-12-01 PROCEDURE — 71000033 HC RECOVERY, INTIAL HOUR: Performed by: SURGERY

## 2023-12-01 PROCEDURE — 37000008 HC ANESTHESIA 1ST 15 MINUTES: Performed by: SURGERY

## 2023-12-01 PROCEDURE — 36000706: Performed by: SURGERY

## 2023-12-01 PROCEDURE — C1729 CATH, DRAINAGE: HCPCS | Performed by: SURGERY

## 2023-12-01 RX ORDER — FAMOTIDINE 20 MG/1
20 TABLET, FILM COATED ORAL
Status: COMPLETED | OUTPATIENT
Start: 2023-12-01 | End: 2023-12-01

## 2023-12-01 RX ORDER — DEXTROSE MONOHYDRATE AND SODIUM CHLORIDE 5; .45 G/100ML; G/100ML
INJECTION, SOLUTION INTRAVENOUS CONTINUOUS
Status: DISCONTINUED | OUTPATIENT
Start: 2023-12-01 | End: 2023-12-01 | Stop reason: HOSPADM

## 2023-12-01 RX ORDER — BUPIVACAINE HYDROCHLORIDE 5 MG/ML
INJECTION, SOLUTION EPIDURAL; INTRACAUDAL
Status: DISCONTINUED | OUTPATIENT
Start: 2023-12-01 | End: 2023-12-01 | Stop reason: HOSPADM

## 2023-12-01 RX ORDER — PROPOFOL 10 MG/ML
VIAL (ML) INTRAVENOUS
Status: DISCONTINUED | OUTPATIENT
Start: 2023-12-01 | End: 2023-12-01

## 2023-12-01 RX ORDER — KETOROLAC TROMETHAMINE 30 MG/ML
INJECTION, SOLUTION INTRAMUSCULAR; INTRAVENOUS
Status: DISCONTINUED | OUTPATIENT
Start: 2023-12-01 | End: 2023-12-01

## 2023-12-01 RX ORDER — GLYCOPYRROLATE 0.2 MG/ML
0.2 INJECTION INTRAMUSCULAR; INTRAVENOUS
Status: COMPLETED | OUTPATIENT
Start: 2023-12-01 | End: 2023-12-01

## 2023-12-01 RX ORDER — SODIUM CHLORIDE 9 MG/ML
INJECTION, SOLUTION INTRAVENOUS CONTINUOUS
Status: DISCONTINUED | OUTPATIENT
Start: 2023-12-01 | End: 2023-12-01 | Stop reason: HOSPADM

## 2023-12-01 RX ORDER — CEFAZOLIN SODIUM IN 0.9 % NACL 2 G/100 ML
2 PLASTIC BAG, INJECTION (ML) INTRAVENOUS
Status: DISCONTINUED | OUTPATIENT
Start: 2023-12-01 | End: 2023-12-01 | Stop reason: CLARIF

## 2023-12-01 RX ORDER — LIDOCAINE HYDROCHLORIDE 10 MG/ML
INJECTION INFILTRATION; PERINEURAL
Status: DISCONTINUED | OUTPATIENT
Start: 2023-12-01 | End: 2023-12-01 | Stop reason: HOSPADM

## 2023-12-01 RX ORDER — ONDANSETRON 2 MG/ML
INJECTION INTRAMUSCULAR; INTRAVENOUS
Status: DISCONTINUED | OUTPATIENT
Start: 2023-12-01 | End: 2023-12-01

## 2023-12-01 RX ORDER — FENTANYL CITRATE 50 UG/ML
INJECTION, SOLUTION INTRAMUSCULAR; INTRAVENOUS
Status: DISCONTINUED | OUTPATIENT
Start: 2023-12-01 | End: 2023-12-01

## 2023-12-01 RX ORDER — MIDAZOLAM HYDROCHLORIDE 1 MG/ML
2 INJECTION INTRAMUSCULAR; INTRAVENOUS
Status: COMPLETED | OUTPATIENT
Start: 2023-12-01 | End: 2023-12-01

## 2023-12-01 RX ORDER — LIDOCAINE HYDROCHLORIDE 20 MG/ML
INJECTION INTRAVENOUS
Status: DISCONTINUED | OUTPATIENT
Start: 2023-12-01 | End: 2023-12-01

## 2023-12-01 RX ADMIN — FENTANYL CITRATE 100 MCG: 50 INJECTION, SOLUTION INTRAMUSCULAR; INTRAVENOUS at 10:12

## 2023-12-01 RX ADMIN — DEXTROSE AND SODIUM CHLORIDE: 5; 450 INJECTION, SOLUTION INTRAVENOUS at 09:12

## 2023-12-01 RX ADMIN — MIDAZOLAM HYDROCHLORIDE 2 MG: 1 INJECTION, SOLUTION INTRAMUSCULAR; INTRAVENOUS at 10:12

## 2023-12-01 RX ADMIN — FAMOTIDINE 20 MG: 20 TABLET ORAL at 09:12

## 2023-12-01 RX ADMIN — LIDOCAINE HYDROCHLORIDE 100 MG: 20 INJECTION, SOLUTION INTRAVENOUS at 10:12

## 2023-12-01 RX ADMIN — CEFAZOLIN 2 G: 2 INJECTION, POWDER, FOR SOLUTION INTRAMUSCULAR; INTRAVENOUS at 10:12

## 2023-12-01 RX ADMIN — KETOROLAC TROMETHAMINE 30 MG: 30 INJECTION, SOLUTION INTRAMUSCULAR; INTRAVENOUS at 10:12

## 2023-12-01 RX ADMIN — ONDANSETRON 4 MG: 2 INJECTION INTRAMUSCULAR; INTRAVENOUS at 10:12

## 2023-12-01 RX ADMIN — PROPOFOL 140 MG: 10 INJECTION, EMULSION INTRAVENOUS at 10:12

## 2023-12-01 RX ADMIN — PROPOFOL 60 MG: 10 INJECTION, EMULSION INTRAVENOUS at 10:12

## 2023-12-01 RX ADMIN — GLYCOPYRROLATE 0.2 MG: 0.2 INJECTION INTRAMUSCULAR; INTRAVENOUS at 09:12

## 2023-12-01 NOTE — DISCHARGE INSTRUCTIONS
DECREASED ACTIVITY TODAY, NO HEAVY LIFTING OR STRAINING, NO PUSHING OR PULLING, NO DRIVING TODAY OR WHILE ON PAIN MEDS, SHOWERS ONLY, TO TUB BATHS, DECREASED ACTIVITY UNTIL FOLLOW UP APPOINTMENT.    CALL YOUR DOCTOR IF YOU HAVE ANY OF THE FOLLOWING: ELEVATED TEMP  OR GREATER, INCREASED SWELLING, FOUL DRAINAGE FROM WOUND/INCISION, PAIN UNRELIEVED BY MEDICATION, NAUSEA AND /OR VOMITING, WEAKNESS, EXCESSIVE BRIGHT RED BLEEDING FROM SITE, DIFFICULTY BREATHING OR SWALLOWING.    [facility] WILL CONTACT YOU TO SCHEDULE A HOME VISIT. PLEASE CALL DR JEAN'S OFFICE WITH ANY QUESTIONS OR CONCERNS.     Home Health Care-  [facility] Drain Management.  Cleanse wound daily with saline and pat dry, then redress with gauze and ACE Wrap.

## 2023-12-01 NOTE — DISCHARGE SUMMARY
[facility]  [facility]  Short Stay    Procedure(s) (LRB):  EXCISION, LIPOMA (Left)      OUTCOME: Patient tolerated treatment/procedure well without complication and is now ready for discharge.    DISPOSITION: Home or Self Care    FINAL DIAGNOSIS:  Lipoma of left lower extremity 15 x 9 x 5 cm    FOLLOWUP: In clinic 1 week    DISCHARGE INSTRUCTIONS:    Discharge Procedure Orders   Diet general        TIME SPENT ON DISCHARGE:   Five minutes

## 2023-12-01 NOTE — PLAN OF CARE
Physician ordered to consult home health care. C discussed with patient. Pt's preference : [facility] as patient has used their services in the past. Referral made to [facility] per phone/fax, spoke with Therese. [facility] nurse to admit patient the day after discharge.

## 2023-12-01 NOTE — OP NOTE
[facility]  [facility]      Date of Procedure: 12/1/2023     Procedure: Procedure(s) (LRB):  EXCISION, LIPOMA (Left)   15 x 9 x 5 cm area of excision left inner thigh  Surgeon(s) and Role:     * Brandon Oakley MD - Primary    Assisting Surgeon: None    Pre-Operative Diagnosis: Lipoma of left lower extremity [D17.24]    Post-Operative Diagnosis: Post-Op Diagnosis Codes:     * Lipoma of left lower extremity [D17.24]  15 x 9 x 5 cm area of excision  Anesthesia: General/MAC    Operative Findings (including complications, if any):  Patient is a 57-year-old  female with a history of hypertension hypercholesterolemia type 2 diabetes with an A1c of 6.9 anxiety depression and bilateral DVTs with morbid obesity at 5 ft 5 inch 260 lb.  Patient had a large lipoma in the left inner thigh that required excision.  Patient underwent excision with a 15 blade scalpel.  Hemostasis was achieved with Bovie cautery and a LigaSure.  Patient had reapproximation deep layer with 0 Vicryl.  The skin was closed with 4-0 plain gut suture.  Sterile dressings were applied no problems were encountered patient tolerated procedure well.  Deep space was drained with a YU drain.        Description of Technical Procedures:  Noted above       Significant Surgical Tasks Conducted by the Assistant(s), if Applicable:  None       Estimated Blood Loss (EBL): * No values recorded between 12/1/2023 10:47 AM and 12/1/2023 11:21 AM *           Implants: * No implants in log *    Specimens:   Specimen (24h ago, onward)       Start     Ordered    12/01/23 1102  Specimen to Pathology  RELEASE UPON ORDERING        References:    Click here for ordering Quick Tip   Question:  Release to patient  Answer:  Immediate    12/01/23 1102    12/01/23 1100  Specimen to Pathology General Surgery  Once        References:    Click here for ordering Quick Tip   Question Answer Comment   Procedure Type: General Surgery    Specimen  Source Lipoma    Clinical Information: Lipoma Left Thigh    Release to patient Immediate        12/01/23 1100                            Condition: Good    Disposition: PACU - hemodynamically stable.    Attestation: I was present and scrubbed for the entire procedure.    Discharge Note    OUTCOME: Patient tolerated treatment/procedure well without complication and is now ready for discharge.    DISPOSITION: Home or Self Care    FINAL DIAGNOSIS:  Lipoma of left lower extremity    FOLLOWUP: In clinic 1 week    DISCHARGE INSTRUCTIONS:    Discharge Procedure Orders   Diet general

## 2023-12-01 NOTE — PLAN OF CARE
PT IS BACK TO ROOM, AWAKE AND ALERT, IN STABLE CONDITION, NO DIFFICULTY BREATHING OR SWALLOWING. PT IS TOLERATING LIQUIDS, FAMILY AT SIDE, SR X2, CB IN REACH

## 2023-12-01 NOTE — ANESTHESIA PROCEDURE NOTES
Intubation    Date/Time: 12/1/2023 10:34 AM    Performed by: Otis Salinas CRNA  Authorized by: Otis Salinas CRNA    Intubation:     Induction:  Intravenous    Intubated:  Postinduction    Mask Ventilation:  Easy mask    Attempts:  1    Attempted By:  CRNA    Method of Intubation:  Direct    Blade:  Soto 2    Laryngeal View Grade: Grade I - full view of cords      Difficult Airway Encountered?: No      Airway Device:  Oral endotracheal tube    Airway Device Size:  3.5    Style/Cuff Inflation:  Cuffed    Tube secured:  21    Secured at:  The lips    Placement Verified By:  Capnometry    Complicating Factors:  None    Findings Post-Intubation:  BS equal bilateral and atraumatic/condition of teeth unchanged

## 2023-12-01 NOTE — ANESTHESIA POSTPROCEDURE EVALUATION
Anesthesia Post Evaluation    Patient: Cari Farmer    Procedure(s) Performed: Procedure(s) (LRB):  EXCISION, LIPOMA (Left)    Final Anesthesia Type: general      Patient location during evaluation: PACU  Patient participation: Yes- Able to Participate  Level of consciousness: awake and alert, awake and oriented  Post-procedure vital signs: reviewed and stable  Pain management: adequate  Airway patency: patent  STIVEN mitigation strategies: Preoperative initiation of continuous positive airway pressure (CPAP) or non-invasive positive pressure ventilation (NIPPV)  PONV status at discharge: No PONV  Anesthetic complications: no      Cardiovascular status: blood pressure returned to baseline  Respiratory status: unassisted, room air and spontaneous ventilation  Hydration status: euvolemic  Follow-up not needed.              Vitals Value Taken Time   /88 12/01/23 1136   Temp 98 12/01/23 1136   Pulse 69 12/01/23 1136   Resp 18 12/01/23 1136   SpO2 98 % 12/01/23 1136   Vitals shown include unvalidated device data.      No case tracking events are documented in the log.      Pain/Zan Score: No data recorded

## 2023-12-28 ENCOUNTER — TELEPHONE (OUTPATIENT)
Dept: OBSTETRICS AND GYNECOLOGY | Facility: CLINIC | Age: 58
End: 2023-12-28
Payer: MEDICARE

## 2023-12-28 DIAGNOSIS — R30.0 DYSURIA: Primary | ICD-10-CM

## 2023-12-28 RX ORDER — FLUCONAZOLE 200 MG/1
TABLET ORAL
Qty: 4 TABLET | Refills: 0 | Status: SHIPPED | OUTPATIENT
Start: 2023-12-28

## 2023-12-28 RX ORDER — NITROFURANTOIN 25; 75 MG/1; MG/1
100 CAPSULE ORAL 2 TIMES DAILY
Qty: 14 CAPSULE | Refills: 0 | Status: SHIPPED | OUTPATIENT
Start: 2023-12-28 | End: 2024-01-04

## 2023-12-28 RX ORDER — PHENAZOPYRIDINE HYDROCHLORIDE 200 MG/1
200 TABLET, FILM COATED ORAL 3 TIMES DAILY PRN
Qty: 15 TABLET | Refills: 0 | Status: SHIPPED | OUTPATIENT
Start: 2023-12-28 | End: 2024-12-27

## 2023-12-28 NOTE — TELEPHONE ENCOUNTER
----- Message from Brylee Guillory sent at 12/28/2023 10:12 AM CST -----  Regarding: Pt Advice  Contact: Cari  Pt called stating that she is experiencing signs of a possible UTI. She said that it is burning/ hurting when she urinates and has frequent urination. Reports that she gets UTI frequently due to having diabetes. She wants to know if she can get an antibiotic called out. She request if an antibiotic is sent in that she also get a script of diflucan due to most medications causing her to get a possible yeast infection. [phone].      [facility] [address] [place] [address] [place] [address]  Phone: [phone] Fax: [phone]

## 2023-12-28 NOTE — TELEPHONE ENCOUNTER
Spoke with patient, educated on increasing fluid intake, medication will be sent to the pharmacy. Verbalized understanding. Educated to complete medication if s/s does not improve after completing medication to notify our clinic.     Per Yoselyn Macrobid 100mg 1 tablet po BID x7 days, Pyridium 200mg 1 tablet po TID prn #15, and Diflucan 200mg 1 tablet po QOD x4 doses #4.

## 2024-01-10 LAB — BEAKER SEE SCANNED REPORT: NORMAL

## 2024-01-12 ENCOUNTER — TELEPHONE (OUTPATIENT)
Dept: OBSTETRICS AND GYNECOLOGY | Facility: CLINIC | Age: 59
End: 2024-01-12
Payer: MEDICARE

## 2024-01-12 DIAGNOSIS — R30.0 DYSURIA: Primary | ICD-10-CM

## 2024-01-12 DIAGNOSIS — B37.9 YEAST INFECTION: ICD-10-CM

## 2024-01-12 RX ORDER — FLUCONAZOLE 100 MG/1
TABLET ORAL
Qty: 4 TABLET | Refills: 0 | Status: SHIPPED | OUTPATIENT
Start: 2024-01-12

## 2024-01-12 RX ORDER — CEPHALEXIN 500 MG/1
500 CAPSULE ORAL 4 TIMES DAILY
Qty: 28 CAPSULE | Refills: 0 | Status: SHIPPED | OUTPATIENT
Start: 2024-01-12 | End: 2024-01-19

## 2024-01-12 NOTE — TELEPHONE ENCOUNTER
Phoned patient to discuss s/s. Patient sts c/o dysuria (discomfort) and urinary frequency since yesterday. Sts AZO slightly relieved symptoms.     Per SL educated on controlling blood sugar, increase water intake. Keflex 500mg QID x7 days, Diflucan 200mg now, then repeat 3 in days. Educated patient complete all of medication. If s/s does not improve after medication complete notify office to schedule an appointment for evaluation with Mrs. Ramos. Patient verbalized understanding. Medication sent to pharmacy.

## 2024-01-12 NOTE — TELEPHONE ENCOUNTER
----- Message from Erin Zheng sent at 1/12/2024 10:36 AM CST -----  Regarding: Nurse call    Who Called:  patient  Symptoms (please be specific):  has yeast infection again due to being on all kinds of diabetes meds   Pharmacy name and phone #:   [facility] in [place]  Best Call Back Number:  [phone]  Additional Information:  states needs something stronger, send diflucan as well, bladder spasms- does not want that med sent out

## 2024-01-30 NOTE — PROGRESS NOTES
"Chief Complaint:     Chief Complaint   Patient presents with    Urinary Tract Infection     Reports 3rd UTI this month. States "trouble emptying bladder all the way". Took Azo last night. Given cipro by dr bates yesterday.          HPI:   58 y.o.  female   presents with c/o urinary frequency, incontinence and discomfort, states "has trouble emptying bladder all the way." Report this is her third UTI since the end of 2023. States has decreased her water intake due to not being thirsty c Mounjaro. Reports was seen by Dr Lee, PCP yesterday and rx'd Cipro 500mg bid x 5 days and took OTC AZO last night. Urine cx collected and sent out by PCP.     MENOPAUSAL:  Age at menarche: 10  Age at menopause: 32  Hysterectomy:  Yes, KRUNAL BSO  Last pap: age 32 per Pt  H/o abnl pap: no  Postcoital Bleeding: No  Sexually Active: no   Dyspareunia: No  H/o STI: No   HRT: no  MMG: 10/25/22 Birads 2  H/o abnl MMG: no   Colonoscopy: 2021      Current Outpatient Medications:     albuterol (PROVENTIL) 2.5 mg /3 mL (0.083 %) nebulizer solution, SMARTSI Vial(s) Via Nebulizer 4 Times Daily PRN, Disp: , Rfl:     ALPRAZolam (XANAX) 0.5 MG tablet, Take 0.5 mg by mouth 2 (two) times daily., Disp: , Rfl:     CIPRO 500 mg tablet, Take 500 mg by mouth 2 (two) times daily., Disp: , Rfl:     dapagliflozin (FARXIGA) 10 mg tablet, Take 10 mg by mouth every morning., Disp: , Rfl:     fluconazole (DIFLUCAN) 200 MG Tab, 1 tablet po QOD, Disp: 4 tablet, Rfl: 0    gabapentin (NEURONTIN) 600 MG tablet, TAKE 1 TABLET BY MOUTH 4 TIMES DAILY, Disp: , Rfl:     linaCLOtide (LINZESS) 72 mcg Cap capsule, Take 72 mcg by mouth before breakfast., Disp: , Rfl:     lisinopriL (PRINIVIL,ZESTRIL) 20 MG tablet, , Disp: , Rfl:     lovastatin (MEVACOR) 20 MG tablet, , Disp: , Rfl:     MOUNJARO 5 mg/0.5 mL PnIj, Inject 5 mg into the skin., Disp: , Rfl:     pantoprazole (PROTONIX) 40 MG tablet, Take 40 mg by mouth once daily., Disp: , Rfl:     pioglitazone " (ACTOS) 30 MG tablet, Take 30 mg by mouth every morning., Disp: , Rfl:     vortioxetine (TRINTELLIX) 20 mg Tab, Take 20 mg by mouth., Disp: , Rfl:     fluconazole (DIFLUCAN) 100 MG tablet, 1 tablet po now, then repeat in x3 days. (Patient not taking: Reported on 1/31/2024), Disp: 4 tablet, Rfl: 0    phenazopyridine (PYRIDIUM) 200 MG tablet, Take 1 tablet (200 mg total) by mouth 3 (three) times daily as needed for Pain., Disp: 15 tablet, Rfl: 0    Review of patient's allergies indicates:   Allergen Reactions    Opioids - morphine analogues Nausea Only, Shortness Of Breath and Nausea And Vomiting    Sulfa (sulfonamide antibiotics) Hives and Rash       Social History     Tobacco Use    Smoking status: Never     Passive exposure: Never    Smokeless tobacco: Never   Substance Use Topics    Alcohol use: Not Currently    Drug use: Never       Review of Systems:   General/Constitutional: Chills denies. Fatigue/weakness denies. Fever denies. Night sweats denies. Hot flashes denies    Respiratory: Cough denies. Hemoptysis denies. SOB denies. Sputum production denies. Wheezing denies .   Cardiovascular: Chest pain denies . Dizziness denies. Palpitations denies. Swelling in hands/feet denies    Gastrointestinal: Abdominal pain denies. Blood in stool denies. Constipation denies. Diarrhea denies. Heartburn denies. Nausea denies. Vomiting denies    Genitourinary: Incontinence admits. Blood in urine denies. Frequent urination admits. Painful urination denies. Urinary urgency denies. Nocturia denies    Gynecologic: Irregular menses denies. Heavy bleeding denies. Painful menses denies. Vaginal discharge denies. Vaginal odor denies. Vaginal itching denies. Vaginal lesion denies. Pelvic pain denies. Decreased libido denies. Vulvar lesion denies. Prolapse of genital organs denies. Painful intercourse denies. Postcoital bleeding denies    Psychiatric: Depression denies. Anxiety denies       Physical Exam:   Vitals:    01/31/24 1127   BP:  "126/78   BP Location: Right arm   Patient Position: Sitting   Weight: 113.4 kg (250 lb)   Height: 5' 5" (1.651 m)       Body mass index is 41.6 kg/m².    Physical Exam  Exam conducted with a chaperone present.   Constitutional:       Appearance: She is well-developed.   Abdominal:      General: Abdomen is flat.   Neurological:      Mental Status: She is alert and oriented to person, place, and time.   Psychiatric:         Attention and Perception: Attention normal.         Mood and Affect: Mood is not anxious or depressed.             Assessment:     Patient Active Problem List   Diagnosis    Acute meniscal tear of left knee    Gastroenteritis    Laceration of left index finger without foreign body without damage to nail    Lipoma of left lower extremity       Health Maintenance Due   Topic Date Due    Hepatitis C Screening  Never done    Lipid Panel  Never done    HIV Screening  Never done    Hemoglobin A1c (Diabetic Prevention Screening)  Never done    Colorectal Cancer Screening  Never done    Influenza Vaccine (1) 09/01/2023    COVID-19 Vaccine (7 - 2023-24 season) 09/01/2023    Shingles Vaccine (2 of 2) 10/09/2023    RSV Vaccine (Age 60+ and Pregnant patients) (1 - 1-dose 60+ series) Never done     Health Maintenance Topics with due status: Not Due       Topic Last Completion Date    TETANUS VACCINE 08/02/2023    Mammogram 10/30/2023           Plan:    Cari was seen today for urinary tract infection.    Diagnoses and all orders for this visit:    Dysuria  -     POCT Urinalysis, Dipstick, Automated, W/O Scope +bilirubin +nitrates  -     Urine Culture High Risk    Discussed referral to Urology if UTI's persist.  Requested results of urine cx results from PCP Dr Fox      "

## 2024-01-31 ENCOUNTER — OFFICE VISIT (OUTPATIENT)
Dept: OBSTETRICS AND GYNECOLOGY | Facility: CLINIC | Age: 59
End: 2024-01-31
Payer: MEDICARE

## 2024-01-31 VITALS
SYSTOLIC BLOOD PRESSURE: 126 MMHG | DIASTOLIC BLOOD PRESSURE: 78 MMHG | HEIGHT: 65 IN | WEIGHT: 250 LBS | BODY MASS INDEX: 41.65 KG/M2

## 2024-01-31 DIAGNOSIS — R30.0 DYSURIA: Primary | ICD-10-CM

## 2024-01-31 LAB
BILIRUB UR QL STRIP: POSITIVE
GLUCOSE UR QL STRIP: NEGATIVE
KETONES UR QL STRIP: NEGATIVE
LEUKOCYTE ESTERASE UR QL STRIP: NEGATIVE
PH, POC UA: 5
POC BLOOD, URINE: NEGATIVE
POC NITRATES, URINE: POSITIVE
PROT UR QL STRIP: NEGATIVE
SP GR UR STRIP: 1.03 (ref 1–1.03)
UROBILINOGEN UR STRIP-ACNC: 1 (ref 0.1–1.1)

## 2024-01-31 PROCEDURE — 1160F RVW MEDS BY RX/DR IN RCRD: CPT | Mod: ,,, | Performed by: NURSE PRACTITIONER

## 2024-01-31 PROCEDURE — 81003 URINALYSIS AUTO W/O SCOPE: CPT | Mod: QW,RHCUB | Performed by: NURSE PRACTITIONER

## 2024-01-31 PROCEDURE — 3074F SYST BP LT 130 MM HG: CPT | Mod: ,,, | Performed by: NURSE PRACTITIONER

## 2024-01-31 PROCEDURE — 3078F DIAST BP <80 MM HG: CPT | Mod: ,,, | Performed by: NURSE PRACTITIONER

## 2024-01-31 PROCEDURE — 1159F MED LIST DOCD IN RCRD: CPT | Mod: ,,, | Performed by: NURSE PRACTITIONER

## 2024-01-31 PROCEDURE — 3008F BODY MASS INDEX DOCD: CPT | Mod: ,,, | Performed by: NURSE PRACTITIONER

## 2024-01-31 PROCEDURE — 99212 OFFICE O/P EST SF 10 MIN: CPT | Mod: ,,, | Performed by: NURSE PRACTITIONER

## 2024-01-31 RX ORDER — CIPROFLOXACIN HYDROCHLORIDE 500 MG/1
500 TABLET, FILM COATED ORAL 2 TIMES DAILY
COMMUNITY
Start: 2024-01-30 | End: 2024-02-01

## 2024-02-01 DIAGNOSIS — N30.00 ACUTE CYSTITIS WITHOUT HEMATURIA: ICD-10-CM

## 2024-02-01 DIAGNOSIS — R30.0 DYSURIA: Primary | ICD-10-CM

## 2024-02-01 DIAGNOSIS — R31.9 HEMATURIA, UNSPECIFIED TYPE: ICD-10-CM

## 2024-02-01 RX ORDER — NITROFURANTOIN 25; 75 MG/1; MG/1
100 CAPSULE ORAL 2 TIMES DAILY
Qty: 14 CAPSULE | Refills: 0 | Status: SHIPPED | OUTPATIENT
Start: 2024-02-01 | End: 2024-02-08

## 2024-02-01 NOTE — PROGRESS NOTES
Spoke to Deepti at Dr Fox's office.  Pt's urine culture in, was treated with Cipro but sensitivity intermediate to Cipro, but is susceptible to Macrobid.  Rx sent to [facility] and patient notified to d/c Cipro and begin Macrobid 100 mg po bid x 7 days.

## 2024-02-29 ENCOUNTER — HOSPITAL ENCOUNTER (OUTPATIENT)
Dept: RADIOLOGY | Facility: HOSPITAL | Age: 59
Discharge: HOME | End: 2024-02-29
Attending: INTERNAL MEDICINE
Payer: MEDICARE

## 2024-02-29 DIAGNOSIS — M25.561 RIGHT KNEE PAIN: ICD-10-CM

## 2024-02-29 PROCEDURE — 73562 X-RAY EXAM OF KNEE 3: CPT | Mod: TC,RT

## 2024-03-14 ENCOUNTER — OFFICE VISIT (OUTPATIENT)
Dept: OBSTETRICS AND GYNECOLOGY | Facility: CLINIC | Age: 59
End: 2024-03-14
Payer: MEDICARE

## 2024-03-14 VITALS
BODY MASS INDEX: 41.87 KG/M2 | TEMPERATURE: 97 F | WEIGHT: 251.63 LBS | DIASTOLIC BLOOD PRESSURE: 70 MMHG | SYSTOLIC BLOOD PRESSURE: 138 MMHG

## 2024-03-14 DIAGNOSIS — N39.0 RECURRENT UTI: ICD-10-CM

## 2024-03-14 DIAGNOSIS — R35.0 URINARY FREQUENCY: ICD-10-CM

## 2024-03-14 DIAGNOSIS — R39.15 URINARY URGENCY: ICD-10-CM

## 2024-03-14 DIAGNOSIS — R30.9 URINARY PAIN: Primary | ICD-10-CM

## 2024-03-14 LAB
BILIRUB UR QL STRIP: POSITIVE
GLUCOSE UR QL STRIP: POSITIVE
KETONES UR QL STRIP: POSITIVE
LEUKOCYTE ESTERASE UR QL STRIP: POSITIVE
PH, POC UA: 5
POC BLOOD, URINE: POSITIVE
POC NITRATES, URINE: POSITIVE
PROT UR QL STRIP: POSITIVE
SP GR UR STRIP: 1.02 (ref 1–1.03)
UROBILINOGEN UR STRIP-ACNC: 2 (ref 0.1–1.1)

## 2024-03-14 PROCEDURE — 3078F DIAST BP <80 MM HG: CPT | Mod: ,,, | Performed by: NURSE PRACTITIONER

## 2024-03-14 PROCEDURE — 3008F BODY MASS INDEX DOCD: CPT | Mod: ,,, | Performed by: NURSE PRACTITIONER

## 2024-03-14 PROCEDURE — 1160F RVW MEDS BY RX/DR IN RCRD: CPT | Mod: ,,, | Performed by: NURSE PRACTITIONER

## 2024-03-14 PROCEDURE — 99213 OFFICE O/P EST LOW 20 MIN: CPT | Mod: ,,, | Performed by: NURSE PRACTITIONER

## 2024-03-14 PROCEDURE — 1159F MED LIST DOCD IN RCRD: CPT | Mod: ,,, | Performed by: NURSE PRACTITIONER

## 2024-03-14 PROCEDURE — 3075F SYST BP GE 130 - 139MM HG: CPT | Mod: ,,, | Performed by: NURSE PRACTITIONER

## 2024-03-14 PROCEDURE — 81003 URINALYSIS AUTO W/O SCOPE: CPT | Mod: QW,RHCUB | Performed by: NURSE PRACTITIONER

## 2024-03-14 RX ORDER — METHENAMINE, SODIUM PHOSPHATE, MONOBASIC, MONOHYDRATE, PHENYL SALICYLATE, METHYLENE BLUE, AND HYOSCYAMINE SULFATE 120; 40.8; 36.2; 10.8; .12 MG/1; MG/1; MG/1; MG/1; MG/1
1 TABLET ORAL 3 TIMES DAILY
Qty: 20 TABLET | Refills: 0 | Status: SHIPPED | OUTPATIENT
Start: 2024-03-14

## 2024-03-14 RX ORDER — TIRZEPATIDE 7.5 MG/.5ML
7.5 INJECTION, SOLUTION SUBCUTANEOUS WEEKLY
COMMUNITY
Start: 2024-02-24

## 2024-03-14 RX ORDER — NITROFURANTOIN 25; 75 MG/1; MG/1
100 CAPSULE ORAL 2 TIMES DAILY
Qty: 14 CAPSULE | Refills: 0 | Status: SHIPPED | OUTPATIENT
Start: 2024-03-14 | End: 2024-03-21

## 2024-03-14 NOTE — PROGRESS NOTES
Chief Complaint:     Chief Complaint   Patient presents with    Urinary Pain     Pt c/o urinary pain that began yesterday, pt states her back and lower abdomen hurts and whenever she goes to the bathroom she only urinates a little or she have to push on her bladder in order to urinate. Pt states she took AZO at noon on yesterday and it have not helped         HPI:   58 y.o.  female   presents with c/o urinary urgency, frequency, pressure in bladder area, pain with urination x 4 days.  Took AZO yesterday.  Denies fever.  Treated with Macrobid with + culture 24.      MENOPAUSAL:  Age at menarche: 10  Age at menopause: 32  Hysterectomy:  Yes, KRUNAL BSO  Last pap: age 32 per Pt  H/o abnl pap: no  Postcoital Bleeding: No  Sexually Active: no   Dyspareunia: No  H/o STI: No   HRT: no  MMG: 10/30/23 neg  H/o abnl MMG: no   Colonoscopy: 2021      Current Outpatient Medications:     albuterol (PROVENTIL) 2.5 mg /3 mL (0.083 %) nebulizer solution, SMARTSI Vial(s) Via Nebulizer 4 Times Daily PRN, Disp: , Rfl:     ALPRAZolam (XANAX) 0.5 MG tablet, Take 0.5 mg by mouth 2 (two) times daily., Disp: , Rfl:     dapagliflozin (FARXIGA) 10 mg tablet, Take 10 mg by mouth every morning., Disp: , Rfl:     fluconazole (DIFLUCAN) 200 MG Tab, 1 tablet po QOD, Disp: 4 tablet, Rfl: 0    gabapentin (NEURONTIN) 600 MG tablet, TAKE 1 TABLET BY MOUTH 4 TIMES DAILY, Disp: , Rfl:     linaCLOtide (LINZESS) 72 mcg Cap capsule, Take 72 mcg by mouth before breakfast., Disp: , Rfl:     lisinopriL (PRINIVIL,ZESTRIL) 20 MG tablet, , Disp: , Rfl:     lovastatin (MEVACOR) 20 MG tablet, , Disp: , Rfl:     MOUNJARO 7.5 mg/0.5 mL PnIj, Inject 7.5 mg into the skin once a week., Disp: , Rfl:     pantoprazole (PROTONIX) 40 MG tablet, Take 40 mg by mouth once daily., Disp: , Rfl:     pioglitazone (ACTOS) 30 MG tablet, Take 30 mg by mouth every morning., Disp: , Rfl:     vortioxetine (TRINTELLIX) 20 mg Tab, Take 20 mg by mouth., Disp: , Rfl:      fluconazole (DIFLUCAN) 100 MG tablet, 1 tablet po now, then repeat in x3 days. (Patient not taking: Reported on 1/31/2024), Disp: 4 tablet, Rfl: 0    phenazopyridine (PYRIDIUM) 200 MG tablet, Take 1 tablet (200 mg total) by mouth 3 (three) times daily as needed for Pain., Disp: 15 tablet, Rfl: 0    Review of patient's allergies indicates:   Allergen Reactions    Opioids - morphine analogues Nausea Only, Shortness Of Breath and Nausea And Vomiting    Sulfa (sulfonamide antibiotics) Hives and Rash       Social History     Tobacco Use    Smoking status: Never     Passive exposure: Never    Smokeless tobacco: Never   Substance Use Topics    Alcohol use: Not Currently    Drug use: Never       Review of Systems:   General/Constitutional: Chills denies. Fatigue/weakness denies. Fever denies. Night sweats denies. Hot flashes denies    Respiratory: Cough denies. Hemoptysis denies. SOB denies. Sputum production denies. Wheezing denies .   Cardiovascular: Chest pain denies . Dizziness denies. Palpitations denies. Swelling in hands/feet denies    Gastrointestinal: Abdominal pain denies. Blood in stool denies. Constipation denies. Diarrhea denies. Heartburn denies. Nausea denies. Vomiting denies    Genitourinary: Incontinence denies. Blood in urine denies. Frequent urination admits. Painful urination admits. Urinary urgency admits. Nocturia denies    Gynecologic: Irregular menses denies. Heavy bleeding denies. Painful menses denies. Vaginal discharge denies. Vaginal odor denies. Vaginal itching denies. Vaginal lesion denies. Pelvic pain denies. Decreased libido denies. Vulvar lesion denies. Prolapse of genital organs denies. Painful intercourse denies. Postcoital bleeding denies    Psychiatric: Depression denies. Anxiety denies       Physical Exam:   Vitals:    03/14/24 1055   BP: 138/70   BP Location: Right arm   Patient Position: Sitting   BP Method: Large (Manual)   Temp: 97 °F (36.1 °C)   TempSrc: Temporal   Weight: 114.1 kg  (251 lb 9.6 oz)       Body mass index is 41.87 kg/m².    Physical Exam  Exam conducted with a chaperone present.   Constitutional:       Appearance: She is well-developed.   Abdominal:      General: Abdomen is flat.      Tenderness: There is abdominal tenderness (lower abdomen).   Neurological:      Mental Status: She is alert and oriented to person, place, and time.   Psychiatric:         Attention and Perception: Attention normal.         Mood and Affect: Mood is not anxious or depressed.     Back - No CVAT          Assessment:     Patient Active Problem List   Diagnosis    Acute meniscal tear of left knee    Gastroenteritis    Laceration of left index finger without foreign body without damage to nail    Lipoma of left lower extremity       Health Maintenance Due   Topic Date Due    Hepatitis C Screening  Never done    Lipid Panel  Never done    HIV Screening  Never done    Hemoglobin A1c (Diabetic Prevention Screening)  Never done    Colorectal Cancer Screening  Never done    Influenza Vaccine (1) 09/01/2023    COVID-19 Vaccine (7 - 2023-24 season) 09/01/2023    Shingles Vaccine (2 of 2) 10/09/2023    RSV Vaccine (Age 60+ and Pregnant patients) (1 - 1-dose 60+ series) Never done     Health Maintenance Topics with due status: Not Due       Topic Last Completion Date    TETANUS VACCINE 08/02/2023    Mammogram 10/30/2023           Plan:    Cari was seen today for urinary pain.    Diagnoses and all orders for this visit:    Urinary pain  -     POCT Urinalysis, Dipstick, Automated, W/O Scope    Urinary urgency    Urinary frequency    Recurrent UTI  Macrobid 100 mg po bid x 7 days  Urine C&S  Refer to urology for consultation  Increase water intake

## 2024-03-17 LAB — BACTERIA UR CULT: ABNORMAL

## 2024-03-25 ENCOUNTER — TELEPHONE (OUTPATIENT)
Dept: OBSTETRICS AND GYNECOLOGY | Facility: CLINIC | Age: 59
End: 2024-03-25
Payer: MEDICARE

## 2024-03-25 NOTE — TELEPHONE ENCOUNTER
Pt notified of urine cx results, + E coli and that it was sensitive to Macrobid that she was treated c at visit. Also notified that referral was re-faxed to Dr Martínez's office and I will call to check the status on Thursday. Pt verbalized understanding.

## 2024-03-25 NOTE — TELEPHONE ENCOUNTER
Attempted to contact pt regarding urology referral and urine cx results. Urine cx + E. Coli, sensitive to macrobid, which is what she was treated c at [facility]. Contacted Dr Martínez's office and spoke jose armando Xie, reports referral not received. Will fax referral again today.

## 2024-03-25 NOTE — TELEPHONE ENCOUNTER
----- Message from Nicki Aquino sent at 3/25/2024  1:41 PM CDT -----  Regarding: PT Message  Contact: Patient  CALLER: Patient  Mobile          [phone]  :  REASON FOR CALL:   [x] Patient called asking for status update on her urology for consultation.  [x ] Patient is calling for LAB results that she seen on portal. Please call patient       [facility] [address] [address] [place] [address]  Phone: [phone] Fax: [phone]    :

## 2024-05-01 ENCOUNTER — TELEPHONE (OUTPATIENT)
Dept: OBSTETRICS AND GYNECOLOGY | Facility: CLINIC | Age: 59
End: 2024-05-01

## 2024-05-01 ENCOUNTER — OFFICE VISIT (OUTPATIENT)
Dept: OBSTETRICS AND GYNECOLOGY | Facility: CLINIC | Age: 59
End: 2024-05-01
Payer: MEDICARE

## 2024-05-01 VITALS
DIASTOLIC BLOOD PRESSURE: 78 MMHG | SYSTOLIC BLOOD PRESSURE: 138 MMHG | WEIGHT: 254.19 LBS | BODY MASS INDEX: 42.35 KG/M2 | HEIGHT: 65 IN | TEMPERATURE: 98 F

## 2024-05-01 DIAGNOSIS — R35.0 URINARY FREQUENCY: Primary | ICD-10-CM

## 2024-05-01 DIAGNOSIS — R81 GLUCOSURIA: ICD-10-CM

## 2024-05-01 DIAGNOSIS — N39.0 RECURRENT UTI: ICD-10-CM

## 2024-05-01 LAB
BILIRUB UR QL STRIP: NEGATIVE
GLUCOSE UR QL STRIP: POSITIVE
KETONES UR QL STRIP: NEGATIVE
LEUKOCYTE ESTERASE UR QL STRIP: NEGATIVE
PH, POC UA: 5.5
POC BLOOD, URINE: NEGATIVE
POC NITRATES, URINE: NEGATIVE
PROT UR QL STRIP: NEGATIVE
SP GR UR STRIP: 1.02 (ref 1–1.03)
UROBILINOGEN UR STRIP-ACNC: ABNORMAL (ref 0.1–1.1)

## 2024-05-01 PROCEDURE — 99212 OFFICE O/P EST SF 10 MIN: CPT | Mod: ,,, | Performed by: NURSE PRACTITIONER

## 2024-05-01 PROCEDURE — 4010F ACE/ARB THERAPY RXD/TAKEN: CPT | Mod: ,,, | Performed by: NURSE PRACTITIONER

## 2024-05-01 PROCEDURE — 81003 URINALYSIS AUTO W/O SCOPE: CPT | Mod: QW,RHCUB | Performed by: NURSE PRACTITIONER

## 2024-05-01 PROCEDURE — 3078F DIAST BP <80 MM HG: CPT | Mod: ,,, | Performed by: NURSE PRACTITIONER

## 2024-05-01 PROCEDURE — 1159F MED LIST DOCD IN RCRD: CPT | Mod: ,,, | Performed by: NURSE PRACTITIONER

## 2024-05-01 PROCEDURE — 3008F BODY MASS INDEX DOCD: CPT | Mod: ,,, | Performed by: NURSE PRACTITIONER

## 2024-05-01 PROCEDURE — 3075F SYST BP GE 130 - 139MM HG: CPT | Mod: ,,, | Performed by: NURSE PRACTITIONER

## 2024-05-01 PROCEDURE — 1160F RVW MEDS BY RX/DR IN RCRD: CPT | Mod: ,,, | Performed by: NURSE PRACTITIONER

## 2024-05-01 RX ORDER — OXYBUTYNIN CHLORIDE 10 MG/1
10 TABLET, EXTENDED RELEASE ORAL DAILY
Qty: 30 TABLET | Refills: 1 | Status: SHIPPED | OUTPATIENT
Start: 2024-05-01 | End: 2024-05-31

## 2024-05-01 NOTE — TELEPHONE ENCOUNTER
Spoke with patient. Per SL Oxybutynin 10mg daily RF x1. Notified patient, verbalized understanding.

## 2024-05-01 NOTE — PROGRESS NOTES
Chief Complaint     Urinary Frequency    HPI:     Patient is a 58 y.o.  presents today with complaints of frequent urination and bladder spasms.  Urology referral sent last visit due to complaints of frequent UTI's. Reports never received call to make appt.     MENOPAUSAL:  Age at menarche: 10  Age at menopause: 32  Hysterectomy:  Yes, KRUNAL BSO  Last pap: age 32 per Pt  H/o abnl pap: no  Postcoital Bleeding: No  Sexually Active: no   Dyspareunia: No  H/o STI: No   HRT: no  MMG: 10/30/23 neg  H/o abnl MMG: no   Colonoscopy: 2021    Per previous note 3/14/24:  HPI:   58 y.o.  female   presents with c/o urinary urgency, frequency, pressure in bladder area, pain with urination x 4 days.  Took AZO yesterday.  Denies fever.  Treated with Macrobid with + culture 24.     Past Medical History:   Diagnosis Date    Anxiety     Depression     Diabetes mellitus     Digestive disorder     GERD (gastroesophageal reflux disease)     High cholesterol     Hypertension        Past Surgical History:   Procedure Laterality Date    CARPAL TUNNEL RELEASE Bilateral     CHOLECYSTECTOMY      KNEE ARTHROSCOPY W/ ACL RECONSTRUCTION Right     KNEE ARTHROSCOPY W/ MENISCECTOMY Right     KNEE ARTHROSCOPY W/ MENISCECTOMY Left 2023    Procedure: ARTHROSCOPY, KNEE, WITH MENISCECTOMY;  Surgeon: Virgilio Haynes MD;  Location: [facility];  Service: Orthopedics;  Laterality: Left;    LIPOMA RESECTION      Abdomen    LIPOMA RESECTION      Right Thigh    LIPOMA RESECTION Left 2023    Procedure: EXCISION, LIPOMA;  Surgeon: Brandon Oakley MD;  Location: [facility] OR;  Service: General;  Laterality: Left;  lithotomy postition    TOTAL ABDOMINAL HYSTERECTOMY W/ BILATERAL SALPINGOOPHORECTOMY         Family History   Problem Relation Name Age of Onset    Colon cancer Father      Hypertension Father      Lung cancer Father      COPD Mother      Hypertension Mother      Breast cancer Neg Hx      Ovarian cancer Neg Hx       Uterine cancer Neg Hx      Cervical cancer Neg Hx         OB History          0    Para   0    Term   0       0    AB   0    Living   0         SAB   0    IAB   0    Ectopic   0    Multiple   0    Live Births   0                 Current Outpatient Medications on File Prior to Visit   Medication Sig Dispense Refill    albuterol (PROVENTIL) 2.5 mg /3 mL (0.083 %) nebulizer solution SMARTSI Vial(s) Via Nebulizer 4 Times Daily PRN      ALPRAZolam (XANAX) 0.5 MG tablet Take 0.5 mg by mouth 2 (two) times daily.      dapagliflozin (FARXIGA) 10 mg tablet Take 10 mg by mouth every morning.      fluconazole (DIFLUCAN) 200 MG Tab 1 tablet po QOD 4 tablet 0    gabapentin (NEURONTIN) 600 MG tablet TAKE 1 TABLET BY MOUTH 4 TIMES DAILY      linaCLOtide (LINZESS) 72 mcg Cap capsule Take 72 mcg by mouth before breakfast.      lisinopriL (PRINIVIL,ZESTRIL) 20 MG tablet       lovastatin (MEVACOR) 20 MG tablet       lexy-renettablue-s.phos-phsal-hyo (URIMAR-T) 120-10.8-0.12 mg Tab Take 1 tablet by mouth 3 (three) times daily. 20 tablet 0    MOUNJARO 7.5 mg/0.5 mL PnIj Inject 7.5 mg into the skin once a week.      pantoprazole (PROTONIX) 40 MG tablet Take 40 mg by mouth once daily.      pioglitazone (ACTOS) 30 MG tablet Take 30 mg by mouth every morning.      vortioxetine (TRINTELLIX) 20 mg Tab Take 20 mg by mouth.      fluconazole (DIFLUCAN) 100 MG tablet 1 tablet po now, then repeat in x3 days. (Patient not taking: Reported on 2024) 4 tablet 0    phenazopyridine (PYRIDIUM) 200 MG tablet Take 1 tablet (200 mg total) by mouth 3 (three) times daily as needed for Pain. 15 tablet 0     No current facility-administered medications on file prior to visit.       Review of Systems:       Review of Systems   Constitutional:  Negative for chills and fever.   Gastrointestinal:  Negative for abdominal pain, constipation and diarrhea.   Genitourinary:  Positive for frequency. Negative for bladder incontinence, decreased libido,  "dysmenorrhea, dyspareunia, dysuria, flank pain, genital sores, hematuria, hot flashes, menorrhagia, menstrual problem, pelvic pain, urgency, vaginal bleeding, vaginal discharge, vaginal pain, urinary incontinence, postcoital bleeding, postmenopausal bleeding, vaginal dryness and vaginal odor.        Physical Exam:    /78 (BP Location: Right arm, Patient Position: Sitting, BP Method: Large (Manual))   Temp 97.9 °F (36.6 °C) (Temporal)   Ht 5' 5" (1.651 m)   Wt 115.3 kg (254 lb 3.2 oz)   BMI 42.30 kg/m²     Physical Exam   General Exam:    General Appearance: alert, in no acute distress, normal, well nourished.  Psych:  Orientation: time, place, person.  Mood/Affect: Normal     Assessment:   1. Urinary frequency  -     POCT Urinalysis, Dipstick, Automated, W/O Scope    2. Glucosuria    3. Recurrent UTI             Plan:   1. Urinary frequency  - POCT Urinalysis, Dipstick, Automated, W/O Scope    2. Glucosuria  Advised to decrease sugar  3. Recurrent UTI  Spoke to Dr Martínez's office, states he will not see patient for recurrent UTI.  Will Refer to Dr Torres Box for evaluation    UA: WNL  Precautions   Referral resent. To call if not contacted     RTC     This note was transcribed by Rebecca Gerard. There may be transcription errors as a result, however minimal. Effort has been made to ensure accuracy of transcription, but any obvious errors or omissions should be clarified with the author of the document.         "

## 2024-05-01 NOTE — TELEPHONE ENCOUNTER
----- Message from Brylee Guillory sent at 5/1/2024  2:44 PM CDT -----  Regarding: Pt Advice  Contact: LIZ JASMINE  Pt called stating that she was seen today and she was supposed to have some medication called into   [facility] [address] [place] [address] [place]. Pt reports that the medication has been been called in yet. Pt's number [phone]

## 2024-05-15 DIAGNOSIS — R31.29 MICROSCOPIC HEMATURIA: Primary | ICD-10-CM

## 2024-05-16 ENCOUNTER — HOSPITAL ENCOUNTER (OUTPATIENT)
Dept: RADIOLOGY | Facility: HOSPITAL | Age: 59
Discharge: HOME | End: 2024-05-16
Attending: UROLOGY
Payer: MEDICARE

## 2024-05-16 DIAGNOSIS — R31.29 MICROSCOPIC HEMATURIA: ICD-10-CM

## 2024-05-16 PROCEDURE — 74178 CT ABD&PLV WO CNTR FLWD CNTR: CPT | Mod: TC

## 2024-05-16 PROCEDURE — 25500020 PHARM REV CODE 255: Performed by: UROLOGY

## 2024-05-16 RX ADMIN — IOHEXOL 90 ML: 300 INJECTION, SOLUTION INTRAVENOUS at 02:05

## 2024-06-11 ENCOUNTER — HOSPITAL ENCOUNTER (OUTPATIENT)
Dept: PREADMISSION TESTING | Facility: HOSPITAL | Age: 59
Discharge: HOME | End: 2024-06-11
Attending: SURGERY
Payer: MEDICARE

## 2024-06-11 ENCOUNTER — HOSPITAL ENCOUNTER (OUTPATIENT)
Dept: RADIOLOGY | Facility: HOSPITAL | Age: 59
Discharge: HOME | End: 2024-06-11
Attending: SURGERY
Payer: MEDICARE

## 2024-06-11 VITALS — BODY MASS INDEX: 41.65 KG/M2 | HEIGHT: 65 IN | WEIGHT: 250 LBS

## 2024-06-11 DIAGNOSIS — Z12.11 SCREENING FOR COLON CANCER: Primary | ICD-10-CM

## 2024-06-11 PROCEDURE — 93005 ELECTROCARDIOGRAM TRACING: CPT

## 2024-06-11 PROCEDURE — 71046 X-RAY EXAM CHEST 2 VIEWS: CPT | Mod: TC

## 2024-06-11 PROCEDURE — 93010 ELECTROCARDIOGRAM REPORT: CPT | Mod: ,,, | Performed by: INTERNAL MEDICINE

## 2024-06-11 RX ORDER — NITROFURANTOIN 25; 75 MG/1; MG/1
100 CAPSULE ORAL DAILY
COMMUNITY

## 2024-06-11 NOTE — DISCHARGE INSTRUCTIONS

## 2024-06-12 LAB
OHS QRS DURATION: 70 MS
OHS QTC CALCULATION: 423 MS

## 2024-06-17 ENCOUNTER — ANESTHESIA EVENT (OUTPATIENT)
Dept: GASTROENTEROLOGY | Facility: HOSPITAL | Age: 59
End: 2024-06-17
Payer: MEDICARE

## 2024-06-17 NOTE — ANESTHESIA PREPROCEDURE EVALUATION
06/17/2024  Cari Farmer is a 58 y.o., female.    Surgical History    Procedure Laterality Date Comment Source   CARPAL TUNNEL RELEASE Bilateral      CHOLECYSTECTOMY       KNEE ARTHROSCOPY W/ ACL RECONSTRUCTION Right      KNEE ARTHROSCOPY W/ MENISCECTOMY Right      KNEE ARTHROSCOPY W/ MENISCECTOMY Left 02/28/2023 Procedure: ARTHROSCOPY, KNEE, WITH MENISCECTOMY;  Surgeon: Virgilio Haynes MD;  Location: [facility] OR;  Service: Orthopedics;  Laterality: Left;    LIPOMA RESECTION   Abdomen    LIPOMA RESECTION   Right Thigh    LIPOMA RESECTION Left 12/1/2023 Procedure: EXCISION, LIPOMA;  Surgeon: Brandon Oakley MD;  Location: [facility] OR;  Service: General;  Laterality: Left;  lithotomy postition    TOTAL ABDOMINAL HYSTERECTOMY W/ BILATERAL SALPINGOOPHORECTOMY         Medical History    Diagnosis Date Comment Source   Anxiety      Depression      Diabetes mellitus      Digestive disorder      GERD (gastroesophageal reflux disease)      High cholesterol      Hypertension        Pre-op Assessment    I have reviewed the Patient Summary Reports.     I have reviewed the Nursing Notes. I have reviewed the NPO Status.   I have reviewed the Medications.     Review of Systems  Anesthesia Hx:  No problems with previous Anesthesia             Denies Family Hx of Anesthesia complications.    Denies Personal Hx of Anesthesia complications.                    Hematology/Oncology:  Hematology Normal   Oncology Normal                                   EENT/Dental:  EENT/Dental Normal           Cardiovascular:  Exercise tolerance: poor   Hypertension, well controlled                                  Hypertension         Pulmonary:  Pulmonary Normal                       Renal/:  Renal/ Normal                 Hepatic/GI:     GERD, well controlled             Musculoskeletal:  Musculoskeletal Normal                 Neurological:  Neurology Normal                                      Endocrine:  Diabetes, type 2    Diabetes, Type 2 Diabetes                    Metabolic Disorders, Morbid Obesity / BMI > 40  Dermatological:  Skin Normal    Psych:  Psychiatric History anxiety                 Physical Exam  General: Well nourished, Cooperative, Alert and Oriented    Airway:  Mallampati: II / II  Mouth Opening: Normal  TM Distance: Normal  Tongue: Normal  Neck ROM: Extension Decreased, Flexion Decreased    Dental:  Intact        Anesthesia Plan  Type of Anesthesia, risks & benefits discussed:    Anesthesia Type: MAC  Intra-op Monitoring Plan: Standard ASA Monitors  Post Op Pain Control Plan: multimodal analgesia  Induction:  IV  Airway Plan: Direct  Informed Consent: Informed consent signed with the Patient and all parties understand the risks and agree with anesthesia plan.  All questions answered. Patient consented to blood products? Yes  ASA Score: 2  Day of Surgery Review of History & Physical: H&P Update referred to the surgeon/provider.I have interviewed and examined the patient. I have reviewed the patient's H&P dated: There are no significant changes.     Ready For Surgery From Anesthesia Perspective.     .

## 2024-06-19 ENCOUNTER — HOSPITAL ENCOUNTER (OUTPATIENT)
Dept: GASTROENTEROLOGY | Facility: HOSPITAL | Age: 59
Discharge: HOME | End: 2024-06-19
Attending: SURGERY
Payer: MEDICARE

## 2024-06-19 ENCOUNTER — ANESTHESIA (OUTPATIENT)
Dept: GASTROENTEROLOGY | Facility: HOSPITAL | Age: 59
End: 2024-06-19
Payer: MEDICARE

## 2024-06-19 VITALS
OXYGEN SATURATION: 100 % | WEIGHT: 250 LBS | TEMPERATURE: 97 F | HEART RATE: 52 BPM | RESPIRATION RATE: 20 BRPM | BODY MASS INDEX: 41.6 KG/M2 | SYSTOLIC BLOOD PRESSURE: 144 MMHG | DIASTOLIC BLOOD PRESSURE: 62 MMHG

## 2024-06-19 DIAGNOSIS — Z12.11 SCREENING FOR COLON CANCER: ICD-10-CM

## 2024-06-19 DIAGNOSIS — K62.5 RECTAL BLEEDING: ICD-10-CM

## 2024-06-19 LAB — POCT GLUCOSE: 113 MG/DL (ref 70–110)

## 2024-06-19 PROCEDURE — 37000008 HC ANESTHESIA 1ST 15 MINUTES

## 2024-06-19 PROCEDURE — 82962 GLUCOSE BLOOD TEST: CPT

## 2024-06-19 PROCEDURE — 25000003 PHARM REV CODE 250: Performed by: NURSE ANESTHETIST, CERTIFIED REGISTERED

## 2024-06-19 PROCEDURE — 25000003 PHARM REV CODE 250: Performed by: SURGERY

## 2024-06-19 PROCEDURE — 37000009 HC ANESTHESIA EA ADD 15 MINS

## 2024-06-19 PROCEDURE — 63600175 PHARM REV CODE 636 W HCPCS: Performed by: NURSE ANESTHETIST, CERTIFIED REGISTERED

## 2024-06-19 PROCEDURE — S5010 5% DEXTROSE AND 0.45% SALINE: HCPCS | Performed by: SURGERY

## 2024-06-19 RX ORDER — GLYCOPYRROLATE 0.2 MG/ML
INJECTION INTRAMUSCULAR; INTRAVENOUS
Status: DISCONTINUED | OUTPATIENT
Start: 2024-06-19 | End: 2024-06-19

## 2024-06-19 RX ORDER — PROPOFOL 10 MG/ML
VIAL (ML) INTRAVENOUS
Status: DISCONTINUED | OUTPATIENT
Start: 2024-06-19 | End: 2024-06-19

## 2024-06-19 RX ORDER — LIDOCAINE HYDROCHLORIDE 20 MG/ML
INJECTION INTRAVENOUS
Status: DISCONTINUED | OUTPATIENT
Start: 2024-06-19 | End: 2024-06-19

## 2024-06-19 RX ORDER — DEXTROSE MONOHYDRATE AND SODIUM CHLORIDE 5; .45 G/100ML; G/100ML
INJECTION, SOLUTION INTRAVENOUS CONTINUOUS
Status: DISCONTINUED | OUTPATIENT
Start: 2024-06-19 | End: 2024-06-20 | Stop reason: HOSPADM

## 2024-06-19 RX ADMIN — PROPOFOL 30 MG: 10 INJECTION, EMULSION INTRAVENOUS at 11:06

## 2024-06-19 RX ADMIN — PROPOFOL 70 MG: 10 INJECTION, EMULSION INTRAVENOUS at 11:06

## 2024-06-19 RX ADMIN — GLYCOPYRROLATE 0.2 MG: 0.2 INJECTION INTRAMUSCULAR; INTRAVENOUS at 11:06

## 2024-06-19 RX ADMIN — LIDOCAINE HYDROCHLORIDE 50 MG: 20 INJECTION, SOLUTION INTRAVENOUS at 11:06

## 2024-06-19 RX ADMIN — DEXTROSE AND SODIUM CHLORIDE: 5; 450 INJECTION, SOLUTION INTRAVENOUS at 08:06

## 2024-06-19 NOTE — DISCHARGE INSTRUCTIONS
Follow-up with Dr Oakley as scheduled.    Diet: as tolerated    Activity:  decrease activity today, no driving today, resume all activity tomorrow    Notify MD:  increased swelling of abdomen, excessive nausea/vomiting, excessive bright red bleeding from rectum    Medications:  continue your home medications. Keep a list of your home medications at all times for emergencies.

## 2024-06-19 NOTE — ANESTHESIA POSTPROCEDURE EVALUATION
Anesthesia Post Evaluation    Patient: Cari Farmer    Procedure(s) Performed: * No procedures listed *    Final Anesthesia Type: MAC      Patient location during evaluation: GI PACU  Patient participation: Yes- Able to Participate  Level of consciousness: awake and alert, awake and oriented  Post-procedure vital signs: reviewed and stable  Pain management: adequate  Airway patency: patent    PONV status at discharge: No PONV  Anesthetic complications: no      Cardiovascular status: blood pressure returned to baseline  Respiratory status: unassisted, room air and spontaneous ventilation  Hydration status: euvolemic  Follow-up not needed.              Vitals Value Taken Time   /66 06/19/24 0830   Temp 36.1 °C (97 °F) 06/19/24 0830   Pulse 68 06/19/24 0830   Resp 20 06/19/24 0830   SpO2 100 % 06/19/24 0830         No case tracking events are documented in the log.      Pain/Zan Score: No data recorded

## 2024-06-20 ENCOUNTER — HOSPITAL ENCOUNTER (OUTPATIENT)
Dept: PREADMISSION TESTING | Facility: HOSPITAL | Age: 59
Discharge: HOME | End: 2024-06-20
Payer: MEDICARE

## 2024-06-20 NOTE — DISCHARGE SUMMARY
[facility]  Short Stay    Colonoscopy      OUTCOME: Patient tolerated treatment/procedure well without complication and is now ready for discharge.    DISPOSITION: Home or Self Care    FINAL DIAGNOSIS:  Screening for colon cancer    FOLLOWUP: In clinic    DISCHARGE INSTRUCTIONS:  No discharge procedures on file.      Clinical Reference Documents Added to Patient Instructions         Document    COLONOSCOPY (ENGLISH)            TIME SPENT ON DISCHARGE: 5 minutes

## 2024-12-21 ENCOUNTER — HOSPITAL ENCOUNTER (EMERGENCY)
Facility: HOSPITAL | Age: 59
Discharge: HOME | End: 2024-12-21
Attending: FAMILY MEDICINE
Payer: MEDICARE

## 2024-12-21 VITALS
HEART RATE: 86 BPM | HEIGHT: 65 IN | OXYGEN SATURATION: 100 % | BODY MASS INDEX: 37.82 KG/M2 | RESPIRATION RATE: 18 BRPM | WEIGHT: 227 LBS | SYSTOLIC BLOOD PRESSURE: 144 MMHG | TEMPERATURE: 98 F | DIASTOLIC BLOOD PRESSURE: 81 MMHG
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
## 2024-12-21 PROCEDURE — 99284 EMERGENCY DEPT VISIT MOD MDM: CPT | Mod: 25

## 2024-12-21 RX ORDER — HYDROCODONE BITARTRATE AND ACETAMINOPHEN 5; 325 MG/1; MG/1
1 TABLET ORAL EVERY 12 HOURS PRN
Qty: 6 TABLET | Refills: 0 | Status: SHIPPED | OUTPATIENT
Start: 2024-12-21
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
## 2025-07-11 ENCOUNTER — HOSPITAL ENCOUNTER (OUTPATIENT)
Dept: RADIOLOGY | Facility: HOSPITAL | Age: 60
Discharge: HOME | End: 2025-07-11
Attending: INTERNAL MEDICINE
Payer: MEDICARE

## 2025-07-11 DIAGNOSIS — K85.91: ICD-10-CM

## 2025-07-11 PROCEDURE — A9698 NON-RAD CONTRAST MATERIALNOC: HCPCS | Performed by: INTERNAL MEDICINE

## 2025-07-11 PROCEDURE — 74177 CT ABD & PELVIS W/CONTRAST: CPT | Mod: TC

## 2025-07-11 PROCEDURE — 25500020 PHARM REV CODE 255: Performed by: INTERNAL MEDICINE

## 2025-07-11 RX ADMIN — IOHEXOL 100 ML: 350 INJECTION, SOLUTION INTRAVENOUS at 08:07

## 2025-07-11 RX ADMIN — IOHEXOL 1000 ML: 9 SOLUTION ORAL at 06:07

## 2025-08-15 ENCOUNTER — HOSPITAL ENCOUNTER (OUTPATIENT)
Dept: PREADMISSION TESTING | Facility: HOSPITAL | Age: 60
Discharge: HOME | End: 2025-08-15
Attending: INTERNAL MEDICINE
Payer: MEDICARE

## 2025-08-15 VITALS — BODY MASS INDEX: 38.32 KG/M2 | WEIGHT: 230 LBS | HEIGHT: 65 IN

## 2025-08-15 DIAGNOSIS — K27.9 PEPTIC ULCER WITHOUT HEMORRHAGE OR PERFORATION BUT WITH OBSTRUCTION: Primary | ICD-10-CM

## 2025-08-15 DIAGNOSIS — K56.609 PEPTIC ULCER WITHOUT HEMORRHAGE OR PERFORATION BUT WITH OBSTRUCTION: Primary | ICD-10-CM

## 2025-08-15 RX ORDER — LUBIPROSTONE 0.02 MG/1
24 CAPSULE ORAL
COMMUNITY
Start: 2025-05-28

## 2025-08-15 RX ORDER — GLYCOPYRROLATE 0.2 MG/ML
0.2 INJECTION INTRAMUSCULAR; INTRAVENOUS ONCE
Status: CANCELLED | OUTPATIENT
Start: 2025-08-20

## 2025-08-15 RX ORDER — SUCRALFATE 1 G/1
1 TABLET ORAL 4 TIMES DAILY
COMMUNITY
Start: 2025-07-26

## 2025-08-15 RX ORDER — CEFAZOLIN SODIUM 1 G/3ML
2 INJECTION, POWDER, FOR SOLUTION INTRAMUSCULAR; INTRAVENOUS
Status: CANCELLED | OUTPATIENT
Start: 2025-08-20

## 2025-08-15 RX ORDER — SODIUM CHLORIDE, SODIUM LACTATE, POTASSIUM CHLORIDE, CALCIUM CHLORIDE 600; 310; 30; 20 MG/100ML; MG/100ML; MG/100ML; MG/100ML
INJECTION, SOLUTION INTRAVENOUS CONTINUOUS
Status: CANCELLED | OUTPATIENT
Start: 2025-08-20

## 2025-08-18 ENCOUNTER — ANESTHESIA EVENT (OUTPATIENT)
Dept: GASTROENTEROLOGY | Facility: HOSPITAL | Age: 60
End: 2025-08-18
Payer: MEDICARE

## 2025-08-20 ENCOUNTER — ANESTHESIA (OUTPATIENT)
Dept: GASTROENTEROLOGY | Facility: HOSPITAL | Age: 60
End: 2025-08-20
Payer: MEDICARE

## 2025-08-20 ENCOUNTER — HOSPITAL ENCOUNTER (OUTPATIENT)
Dept: GASTROENTEROLOGY | Facility: HOSPITAL | Age: 60
Discharge: HOME | End: 2025-08-20
Attending: INTERNAL MEDICINE
Payer: MEDICARE

## 2025-08-20 VITALS
WEIGHT: 230 LBS | DIASTOLIC BLOOD PRESSURE: 58 MMHG | HEART RATE: 62 BPM | OXYGEN SATURATION: 100 % | TEMPERATURE: 97 F | SYSTOLIC BLOOD PRESSURE: 132 MMHG | BODY MASS INDEX: 38.32 KG/M2 | HEIGHT: 65 IN | RESPIRATION RATE: 18 BRPM

## 2025-08-20 DIAGNOSIS — K56.609 PEPTIC ULCER WITHOUT HEMORRHAGE OR PERFORATION BUT WITH OBSTRUCTION: ICD-10-CM

## 2025-08-20 DIAGNOSIS — K27.9 PEPTIC ULCER DISEASE: ICD-10-CM

## 2025-08-20 DIAGNOSIS — K27.9 PEPTIC ULCER WITHOUT HEMORRHAGE OR PERFORATION BUT WITH OBSTRUCTION: ICD-10-CM

## 2025-08-20 LAB — POCT GLUCOSE: 128 MG/DL (ref 70–110)

## 2025-08-20 PROCEDURE — 27201423 OPTIME MED/SURG SUP & DEVICES STERILE SUPPLY: Performed by: INTERNAL MEDICINE

## 2025-08-20 PROCEDURE — S5010 5% DEXTROSE AND 0.45% SALINE: HCPCS | Performed by: INTERNAL MEDICINE

## 2025-08-20 PROCEDURE — S5010 5% DEXTROSE AND 0.45% SALINE: HCPCS | Performed by: NURSE ANESTHETIST, CERTIFIED REGISTERED

## 2025-08-20 PROCEDURE — 82962 GLUCOSE BLOOD TEST: CPT | Performed by: INTERNAL MEDICINE

## 2025-08-20 PROCEDURE — 63600175 PHARM REV CODE 636 W HCPCS: Mod: JZ,TB | Performed by: INTERNAL MEDICINE

## 2025-08-20 PROCEDURE — 37000008 HC ANESTHESIA 1ST 15 MINUTES: Performed by: INTERNAL MEDICINE

## 2025-08-20 PROCEDURE — 25000003 PHARM REV CODE 250: Performed by: NURSE ANESTHETIST, CERTIFIED REGISTERED

## 2025-08-20 PROCEDURE — 25000003 PHARM REV CODE 250: Performed by: INTERNAL MEDICINE

## 2025-08-20 PROCEDURE — 63600175 PHARM REV CODE 636 W HCPCS: Performed by: NURSE ANESTHETIST, CERTIFIED REGISTERED

## 2025-08-20 RX ORDER — PROPOFOL 10 MG/ML
VIAL (ML) INTRAVENOUS
Status: DISCONTINUED | OUTPATIENT
Start: 2025-08-20 | End: 2025-08-20

## 2025-08-20 RX ORDER — GLYCOPYRROLATE 0.2 MG/ML
0.2 INJECTION INTRAMUSCULAR; INTRAVENOUS ONCE
Status: COMPLETED | OUTPATIENT
Start: 2025-08-20 | End: 2025-08-20

## 2025-08-20 RX ORDER — CEFAZOLIN SODIUM 1 G/3ML
2 INJECTION, POWDER, FOR SOLUTION INTRAMUSCULAR; INTRAVENOUS
Status: DISCONTINUED | OUTPATIENT
Start: 2025-08-20 | End: 2025-08-21 | Stop reason: HOSPADM

## 2025-08-20 RX ORDER — GLYCOPYRROLATE 0.2 MG/ML
INJECTION INTRAMUSCULAR; INTRAVENOUS
Status: DISCONTINUED | OUTPATIENT
Start: 2025-08-20 | End: 2025-08-20

## 2025-08-20 RX ORDER — DEXTROSE MONOHYDRATE AND SODIUM CHLORIDE 5; .45 G/100ML; G/100ML
INJECTION, SOLUTION INTRAVENOUS CONTINUOUS
Status: DISCONTINUED | OUTPATIENT
Start: 2025-08-20 | End: 2025-08-21 | Stop reason: HOSPADM

## 2025-08-20 RX ORDER — DEXTROSE MONOHYDRATE AND SODIUM CHLORIDE 5; .45 G/100ML; G/100ML
INJECTION, SOLUTION INTRAVENOUS CONTINUOUS PRN
Status: DISCONTINUED | OUTPATIENT
Start: 2025-08-20 | End: 2025-08-20

## 2025-08-20 RX ORDER — LIDOCAINE HYDROCHLORIDE 20 MG/ML
INJECTION, SOLUTION EPIDURAL; INFILTRATION; INTRACAUDAL; PERINEURAL
Status: DISCONTINUED | OUTPATIENT
Start: 2025-08-20 | End: 2025-08-20

## 2025-08-20 RX ADMIN — LIDOCAINE HYDROCHLORIDE 5 ML: 20 INJECTION, SOLUTION EPIDURAL; INFILTRATION; INTRACAUDAL; PERINEURAL at 06:08

## 2025-08-20 RX ADMIN — GLYCOPYRROLATE 0.2 MG: 0.2 INJECTION INTRAMUSCULAR; INTRAVENOUS at 05:08

## 2025-08-20 RX ADMIN — DEXTROSE AND SODIUM CHLORIDE: 5; 450 INJECTION, SOLUTION INTRAVENOUS at 06:08

## 2025-08-20 RX ADMIN — PROPOFOL 100 MG: 10 INJECTION, EMULSION INTRAVENOUS at 06:08

## 2025-08-20 RX ADMIN — GLYCOPYRROLATE 0.2 MG: 0.2 INJECTION INTRAMUSCULAR; INTRAVENOUS at 06:08

## 2025-09-05 ENCOUNTER — TELEPHONE (OUTPATIENT)
Dept: OBSTETRICS AND GYNECOLOGY | Facility: CLINIC | Age: 60
End: 2025-09-05
Payer: MEDICARE

## 2025-09-05 DIAGNOSIS — Z12.31 ENCOUNTER FOR SCREENING MAMMOGRAM FOR MALIGNANT NEOPLASM OF BREAST: Primary | ICD-10-CM

## (undated) DEVICE — PAD ELECTROSURGICAL SPL W/CORD

## (undated) DEVICE — EVACUATOR WOUND BULB 100CC

## (undated) DEVICE — SUT PLAIN GUT 3-0 CTX 27

## (undated) DEVICE — SPONGE GAUZE 16PLY 4X4

## (undated) DEVICE — GLOVE PROTEXIS LTX 6.5

## (undated) DEVICE — GLOVE PROTEXIS BLUE LATEX 7

## (undated) DEVICE — SLEEVE COMPR KNEE LENGTH MED

## (undated) DEVICE — DURAPREP SURG SCRUB 26ML

## (undated) DEVICE — GLOVE PROTEXIS HYDROGEL SZ6.5

## (undated) DEVICE — SUT CTD VICRYL 3-0 CR/SH

## (undated) DEVICE — SEE MEDLINE ITEM 59163

## (undated) DEVICE — DRESSING ADAPTIC TOUCH 3X4

## (undated) DEVICE — SUT 0 8-18 IN CTD VICRYL

## (undated) DEVICE — NDL SAFETY 22G X 1.5 ECLIPSE

## (undated) DEVICE — SUT GUT PL. 4-0 27 FS-2

## (undated) DEVICE — BANDAGE ACE DOUBLE STER 6IN

## (undated) DEVICE — BLADE GREAT WHITE 4.2 6/BX

## (undated) DEVICE — SEALER LIGASURE IMPACT 18CM

## (undated) DEVICE — DRAPE ARTHSCP T ORTHOMAX POUCH

## (undated) DEVICE — PAD ABD 8X10 STERILE

## (undated) DEVICE — DRAIN WOUND RELIAVAC 10MMX20CM

## (undated) DEVICE — Device

## (undated) DEVICE — WRAP COBAN NL STRL 4INX5YD

## (undated) DEVICE — SEE MEDLINE ITEM 157216

## (undated) DEVICE — ADHESIVE DERMABOND ADVANCED

## (undated) DEVICE — GLOVE PROTEXIS HYDROGEL SZ7.5

## (undated) DEVICE — SUT 3-0 PLAIN GUT

## (undated) DEVICE — SET APEX ARTHSCP TUBE 1 CONN

## (undated) DEVICE — BANDAGE ESMARK ELASTIC ST 6X9

## (undated) DEVICE — SUT 3-0 ETHILON 18 FS-1

## (undated) DEVICE — BLADE SURG CARBON STEEL SZ11

## (undated) DEVICE — GOWN POLY REINF BRTH SLV XL

## (undated) DEVICE — DRESSING XEROFORM 5X9IN

## (undated) DEVICE — GLOVE PROTEXIS BLUE LATEX 7.5

## (undated) DEVICE — SYR 10CC LUER LOCK

## (undated) DEVICE — CHLORAPREP W TINT 26ML APPL

## (undated) DEVICE — SOL NACL IRR 3000ML

## (undated) DEVICE — NDL SPINAL 18GX3.5 SPINOCAN

## (undated) DEVICE — SOL IRR SOD CHL .9% POUR

## (undated) DEVICE — CUFF ATS 2 PORT SNGL BLDR 34IN

## (undated) DEVICE — DRESSING N ADH OIL EMUL 3X3